# Patient Record
Sex: FEMALE | Race: WHITE | Employment: OTHER | URBAN - METROPOLITAN AREA
[De-identification: names, ages, dates, MRNs, and addresses within clinical notes are randomized per-mention and may not be internally consistent; named-entity substitution may affect disease eponyms.]

---

## 2017-01-11 ENCOUNTER — OFFICE VISIT (OUTPATIENT)
Dept: ORTHOPEDIC SURGERY | Age: 57
End: 2017-01-11

## 2017-01-11 VITALS
HEART RATE: 73 BPM | SYSTOLIC BLOOD PRESSURE: 137 MMHG | HEIGHT: 67 IN | WEIGHT: 139 LBS | BODY MASS INDEX: 21.82 KG/M2 | DIASTOLIC BLOOD PRESSURE: 62 MMHG

## 2017-01-11 DIAGNOSIS — M51.36 OTHER INTERVERTEBRAL DISC DEGENERATION, LUMBAR REGION: ICD-10-CM

## 2017-01-11 DIAGNOSIS — M54.17 RADICULOPATHY, LUMBOSACRAL REGION: ICD-10-CM

## 2017-01-11 DIAGNOSIS — M47.816 SPONDYLOSIS WITHOUT MYELOPATHY OR RADICULOPATHY, LUMBAR REGION: Primary | ICD-10-CM

## 2017-01-11 RX ORDER — PREGABALIN 300 MG/1
300 CAPSULE ORAL 2 TIMES DAILY
Qty: 60 CAP | Refills: 1 | Status: SHIPPED | OUTPATIENT
Start: 2017-01-11

## 2017-01-11 NOTE — MR AVS SNAPSHOT
Visit Information Date & Time Provider Department Dept. Phone Encounter #  
 1/11/2017  2:45 PM Peter Knight  [facility], [address] [facility] [place] [phone] Follow-up Instructions Return in about 4 weeks (around 2/8/2017). Upcoming Health Maintenance Date Due Hepatitis C Screening 1960 DTaP/Tdap/Td series (1 - Tdap) 5/4/1980 PAP AKA CERVICAL CYTOLOGY 5/4/1980 BREAST CANCER SCRN MAMMOGRAM 5/4/2009 FOBT Q 1 YEAR AGE 50-75 5/4/2009 INFLUENZA AGE 9 TO ADULT 8/1/2016 Allergies as of 1/11/2017  Review Complete On: 1/11/2017 By: Peter Knight MD  
  
 Severity Noted Reaction Type Reactions Codeine    Nausea and Vomiting Morphine    Nausea Only PO is ok just not IV Current Immunizations  Never Reviewed No immunizations on file. Not reviewed this visit You Were Diagnosed With   
  
 Codes Comments Spondylosis without myelopathy or radiculopathy, lumbar region    -  Primary ICD-10-CM: M47.816 ICD-9-CM: 721.3 Other intervertebral disc degeneration, lumbar region     ICD-10-CM: M51.36 
ICD-9-CM: 722.52 Radiculopathy, lumbosacral region     ICD-10-CM: M54.17 ICD-9-CM: 724.4 Vitals BP Pulse Height(growth percentile) Weight(growth percentile) BMI OB Status 137/62 73 5' 7\" (1.702 m) 139 lb (63 kg) 21.77 kg/m2 Hysterectomy Smoking Status Former Smoker Vitals History BMI and BSA Data Body Mass Index Body Surface Area 21.77 kg/m 2 1.73 m 2 Preferred Pharmacy Pharmacy Name Phone [id number] [address] [place] [address] Your Updated Medication List  
  
   
This list is accurate as of: 1/11/17  3:00 PM.  Always use your most recent med list.  
  
  
  
  
 ATIVAN 1 mg tablet Generic drug:  LORazepam  
Take 1 mg by mouth nightly as needed for Anxiety. atorvastatin 20 mg tablet Commonly known as:  LIPITOR Take 10 mg by mouth daily. diclofenac 1.3 % Pt12 Commonly known as:  FLECTOR  
1 Patch by TransDERmal route two (2) times a day for 30 days. FISH OIL PO Take  by mouth.  
  
 * HYDROcodone-acetaminophen 7.5-325 mg per tablet Commonly known as:  Lynnetta Gambino Take  by mouth.  
  
 * HYDROcodone-acetaminophen 7.5-325 mg per tablet Commonly known as:  Lynnetta Gambino Take 1 Tab by mouth four (4) times daily for 30 days. As needed for breakthrough pain; fill on/after 12/21/13  Indications: PAIN  
  
 * lidocaine 5 % Commonly known as:  Carrie Reece Apply patch to the affected area for 12 hours a day and remove for 12 hours a day. * lidocaine 5 % Commonly known as:  Carrie Reece Apply patch to the affected area for 12 hours a day and remove for 12 hours a day. loratadine 10 mg tablet Commonly known as:  CLARITIN  
TAKE 1 TAB BY MOUTH ONCE A DAY FOR 30 DAYS  
  
 LOTEMAX 0.5 % Drpg Generic drug:  loteprednol etabonate APPLY 1 DROP INTO LEFT EYE FOUR TIMES A DAY  
  
 montelukast 10 mg tablet Commonly known as:  SINGULAIR  
TAKE 1 TABLET ONCE A DAY * morphine CR 15 mg CR tablet Commonly known as:  MS CONTIN Take 1 Tab by mouth every six (6) hours for 30 days. For chronic pain; fill on/after 8/20/13   Indications: CHRONIC PAIN, NEUROPATHIC PAIN, SEVERE PAIN  
  
 * morphine CR 15 mg CR tablet Commonly known as:  MS CONTIN Take 1 Tab by mouth every six (6) hours for 30 days. For chronic pain; fill on/after 11/20/13  Indications: CHRONIC PAIN, NEUROPATHIC PAIN, SEVERE PAIN  
  
 * morphine CR 15 mg CR tablet Commonly known as:  MS CONTIN Take 1 Tab by mouth every six (6) hours for 30 days. For chronic pain; fill on/after 12/21/13  Indications: CHRONIC PAIN, NEUROPATHIC PAIN, SEVERE PAIN  
  
 omeprazole 40 mg capsule Commonly known as:  PRILOSEC Take 40 mg by mouth daily. * pregabalin 50 mg capsule [facility]  [facility] Cap by mouth two (2) times a day. Max Daily Amount: 100 mg.  
  
 * pregabalin 50 mg capsule Commonly known as:  Pema Campa Take 1 Cap by mouth two (2) times a day. Max Daily Amount: 100 mg.  
  
 * pregabalin 75 mg capsule Commonly known as:  Pema Campa Take 1 Cap by mouth two (2) times a day. Max Daily Amount: 150 mg.  
  
 * pregabalin 150 mg capsule Commonly known as:  Pema Campa Take 1 Cap by mouth two (2) times a day. Max Daily Amount: 300 mg.  
  
 * pregabalin 225 mg capsule Commonly known as:  Pema Campa Take 1 Cap by mouth two (2) times a day. Max Daily Amount: 450 mg.  
  
 * pregabalin 300 mg capsule Commonly known as:  Pema Campa Take 1 Cap by mouth two (2) times a day. Max Daily Amount: 600 mg.  
  
 raNITIdine 150 mg tablet Commonly known as:  ZANTAC TAKE 1 TAB BY MOUTH TWO TIMES A DAY FOR 30 DAYS  
  
 traMADol 50 mg tablet Commonly known as:  ULTRAM  
Take 1 Tab by mouth two (2) times a day. Max Daily Amount: 100 mg.  
  
 valACYclovir 500 mg tablet Commonly known as:  VALTREX  
500 mg.  
  
 venlafaxine 75 mg tablet Commonly known as:  [facility] Take 1 Tab by mouth three (3) times daily for 30 days. VITAMIN B-12 1,000 mcg sublingual tablet Generic drug:  cyanocobalamin Take 1,000 mcg by mouth daily. VITAMIN D2 50,000 unit capsule Generic drug:  ergocalciferol Take 50,000 Units by mouth every seven (7) days. WELLBUTRIN 100 mg tablet Generic drug:  buPROPion Take 100 mg by mouth daily. * Notice: This list has 13 medication(s) that are the same as other medications prescribed for you. Read the directions carefully, and ask your doctor or other care provider to review them with you. Prescriptions Printed Refills  
 pregabalin (LYRICA) 300 mg capsule 1 Sig: Take 1 Cap by mouth two (2) times a day. Max Daily Amount: 600 mg. Class: Print Route: Oral  
  
We Performed the Following REFERRAL TO ORTHOPEDICS [FHN284 Custom] Comments:  
 Bilateral hip pain Follow-up Instructions Return in about 4 weeks (around 2/8/2017). Referral Information Referral ID Referred By Referred To  
  
 [id number] DEE HOYOS III Not Available Visits Status Start Date End Date 1 New Request 1/11/17 1/11/18 If your referral has a status of pending review or denied, additional information will be sent to support the outcome of this decision. Introducing [facility]! Dear Farzaneh: Thank you for requesting a Rainbow account. Our records indicate that you already have an active Rainbow account. You can access your account anytime at [email or web address] Did you know that you can access your hospital and ER discharge instructions at any time in Rainbow? You can also review all of your test results from your hospital stay or ER visit. Additional Information If you have questions, please visit the Frequently Asked Questions section of the Rainbow website at [email or web address] Remember, Rainbow is NOT to be used for urgent needs. For medical emergencies, dial 911. Now available from your iPhone and Android! Please provide this summary of care documentation to your next provider. Your primary care clinician is listed as GURVINDER James. If you have any questions after today's visit, please call [phone].

## 2017-01-11 NOTE — PROGRESS NOTES
[facility]  [address]  [address], [address]  Phone: [phone]  Fax: [phone]        PROGRESS NOTE      HISTORY OF PRESENT ILLNESS:  The patient is a 61 y.o. female and was seen today for follow up of low back pain extending posterolaterally down BLE. She states BLE pain extends to knees and occasionally to the ankles. Any sustained movement exacerbates pain. She reports onset of back pain after a [place] in [id number]. More recently, she has complaints of upper back pain. Patient reports being discharged from pain management. Note from Porfirio Douglas NP dated 05/12/16 indicates that patient was seen by Dr. Marilu Packer in the ER who said he knew a doctor who could help her with her back pain. According to the notes, patient has been having issues with esophagitis for which she sees Dr. Nicol Wolff. He has taken her off of Motrin and been prescribed Prilosec and Zantac with benefit. Patient reports occasional use of Tramadol and Vicodin. She reports no relief with Lidoderm patches. Patient previously failed Michial Deandre, and TOPAMAX. She tolerates increased Lyrica 150 mg BID with initial benefit. Pt completes her HEP daily. She previously received left SI injection without relief. She attended to physical therapy and was switched to aquatic therapy with significant relief. Patient denies change in bowel or bladder habits. Pt denies fever, weight loss, or skin changes. Preliminary reading of lumbar plain films revealed disc space narrowing at L3-L4. Anterior osteophytes at L3-L4. No acute pathology identified. Lumbar spine MRI dated 11/30/16 reviewed. Per report, mild multilevel disc disease showing little or no significant change compared with the previous examination. No neural impingement. Extensive fatty infiltration of end plates of B5-1 indicative of chronic DDD, not appreciably changed.  Very mild discogenic endplate marrow edema on the left at L4-5 has decreased since the prior exam. At her last clinical appointment, I could not explain her symptoms based on diagnostic testing/spinal pathology. I increased her Lyrica to 225 mg BID. I encouraged patient to perform her HEP daily. The patient returns today with pain centralized to the low back pain extending posterolaterally into the BLE to the knees. Patient states pain no longer extends below the knees. She rates pain 5/10, an improvement since her last visit (8/10). Patient also has chronic bilateral hip pain (R>L). She tolerates increased Lyrica 225 mg BID with benefit.  reviewed. Past Medical History   Diagnosis Date    Carotid duplex 11/18/2013     No significant occlusive disease bilaterally.  Chronic pain     GERD (gastroesophageal reflux disease)     Hiatal hernia     History of myocardial perfusion scan 11/12/2013     No evidence of ongoing ischemia. Mild inferior defect likely artifact. No RWMA. EF 57%. Neg EKG on pharm stress test.    Hypercholesterolemia     Lower back pain     Sciatic nerve pain     Sciatica     Unstable gait         Social History     Social History    Marital status:      Spouse name: N/A    Number of children: N/A    Years of education: N/A     Occupational History    Not on file. Social History Main Topics    Smoking status: Former Smoker     Packs/day: 0.50     Years: 35.00     Types: Cigarettes    Smokeless tobacco: Never Used    Alcohol use Yes      Comment: rarely    Drug use: Yes     Special: Marijuana    Sexual activity: No     Other Topics Concern    Not on file     Social History Narrative    ** Merged History Encounter **            Current Outpatient Prescriptions   Medication Sig Dispense Refill    pregabalin (LYRICA) 300 mg capsule Take 1 Cap by mouth two (2) times a day.  Max Daily Amount: 600 mg. 60 Cap 1    montelukast (SINGULAIR) 10 mg tablet TAKE 1 TABLET ONCE A DAY  0    pregabalin (LYRICA) 225 mg capsule Take 1 Cap by mouth two (2) times a day. Max Daily Amount: 450 mg. 60 Cap 1    valACYclovir (VALTREX) 500 mg tablet 500 mg.  LOTEMAX 0.5 % drpg APPLY 1 DROP INTO LEFT EYE FOUR TIMES A DAY  3    loratadine (CLARITIN) 10 mg tablet TAKE 1 TAB BY MOUTH ONCE A DAY FOR 30 DAYS  6    ranitidine (ZANTAC) 150 mg tablet TAKE 1 TAB BY MOUTH TWO TIMES A DAY FOR 30 DAYS  3    lidocaine (LIDODERM) 5 % Apply patch to the affected area for 12 hours a day and remove for 12 hours a day. 30 Each 1    lidocaine (LIDODERM) 5 % Apply patch to the affected area for 12 hours a day and remove for 12 hours a day. 1 Each 0    traMADol (ULTRAM) 50 mg tablet Take 1 Tab by mouth two (2) times a day. Max Daily Amount: 100 mg. 50 Tab 0    omeprazole (PRILOSEC) 40 mg capsule Take 40 mg by mouth daily.  ergocalciferol (VITAMIN D2) 50,000 unit capsule Take 50,000 Units by mouth every seven (7) days.  atorvastatin (LIPITOR) 20 mg tablet Take 10 mg by mouth daily.  LORazepam (ATIVAN) 1 mg tablet Take 1 mg by mouth nightly as needed for Anxiety.  cyanocobalamin (VITAMIN B-12) 1,000 mcg sublingual tablet Take 1,000 mcg by mouth daily.  HYDROcodone-acetaminophen (NORCO) 7.5-325 mg per tablet Take  by mouth.  buPROPion (WELLBUTRIN) 100 mg tablet Take 100 mg by mouth daily.  pregabalin (LYRICA) 150 mg capsule Take 1 Cap by mouth two (2) times a day. Max Daily Amount: 300 mg. 60 Cap 1    pregabalin (LYRICA) 75 mg capsule Take 1 Cap by mouth two (2) times a day. Max Daily Amount: 150 mg. 60 Cap 1    pregabalin (LYRICA) 50 mg capsule Take 1 Cap by mouth two (2) times a day. Max Daily Amount: 100 mg. 60 Cap 1    pregabalin (LYRICA) 50 mg capsule Take 1 Cap by mouth two (2) times a day. Max Daily Amount: 100 mg. 60 Cap 1    DOCOSAHEXANOIC ACID/EPA (FISH OIL PO) Take  by mouth.  HYDROcodone-acetaminophen (NORCO) 7.5-325 mg per tablet Take 1 Tab by mouth four (4) times daily for 30 days.  As needed for breakthrough pain; fill on/after 12/21/13  Indications: PAIN 120 Tab 0    morphine CR (MS CONTIN) 15 mg CR tablet Take 1 Tab by mouth every six (6) hours for 30 days. For chronic pain; fill on/after 12/21/13  Indications: CHRONIC PAIN, NEUROPATHIC PAIN, SEVERE PAIN 120 Tab 0    morphine CR (MS CONTIN) 15 mg CR tablet Take 1 Tab by mouth every six (6) hours for 30 days. For chronic pain; fill on/after 11/20/13  Indications: CHRONIC PAIN, NEUROPATHIC PAIN, SEVERE PAIN 120 Tab 0    morphine CR (MS CONTIN) 15 mg CR tablet Take 1 Tab by mouth every six (6) hours for 30 days. For chronic pain; fill on/after 8/20/13    Indications: CHRONIC PAIN, NEUROPATHIC PAIN, SEVERE PAIN 120 Tab 0    venlafaxine (EFFEXOR) 75 mg tablet Take 1 Tab by mouth three (3) times daily for 30 days. 90 Tab 5    diclofenac epolamine (FLECTOR) 1.3 % PtMd transdermal patch 1 Patch by TransDERmal route two (2) times a day for 30 days. 60 Patch 5       Allergies   Allergen Reactions    Codeine Nausea and Vomiting    Morphine Nausea Only     PO is ok just not IV           PHYSICAL EXAMINATION    Visit Vitals    /62    Pulse 73    Ht 5' 7\" (1.702 m)    Wt 139 lb (63 kg)    BMI 21.77 kg/m2       CONSTITUTIONAL: NAD, A&O x 3  SENSATION: No increased groin pain with internal rotation of the bilateral hips. Intact to light touch throughout  RANGE OF MOTION: Good ROM, bilateral hips. The patient has full passive range of motion in all four extremities. MOTOR:  Straight Leg Raise: Negative, bilateral               Hip Flex Knee Ext Knee Flex Ankle DF GTE Ankle PF Tone   Right +4/5 +4/5 +4/5 +4/5 +4/5 +4/5 +4/5   Left +4/5 +4/5 +4/5 +4/5 +4/5 +4/5 +4/5       ASSESSMENT   Farzaneh was seen today for follow-up.     Diagnoses and all orders for this visit:    Spondylosis without myelopathy or radiculopathy, lumbar region  -     REFERRAL TO ORTHOPEDICS    Other intervertebral disc degeneration, lumbar region  -     REFERRAL TO ORTHOPEDICS    Radiculopathy, lumbosacral region  -     REFERRAL TO ORTHOPEDICS    Other orders  -     pregabalin (LYRICA) 300 mg capsule; Take 1 Cap by mouth two (2) times a day. Max Daily Amount: 600 mg. IMPRESSION AND PLAN:  Her pain appears to have centralized and pain no longer extends below the knees of the BLE. She also reports of chronic bilateral hip problems and pain. Her Examination was not consistent with hip necessarily. Her ROM of the bilateral hips was well-maintained. At her request, I will refer her to ortho for further evaluation. I will increase her Lyrica to 300 mg BID. Patient advised to call the office if intolerant to higher dose. I will see the patient back in 1 month's time or earlier if needed. Written by [place], as dictated by Pamela Bueno MD  I examined the patient, reviewed and agree with the note.

## 2017-02-01 ENCOUNTER — OFFICE VISIT (OUTPATIENT)
Dept: ORTHOPEDIC SURGERY | Age: 57
End: 2017-02-01

## 2017-02-01 VITALS
HEART RATE: 59 BPM | SYSTOLIC BLOOD PRESSURE: 112 MMHG | TEMPERATURE: 97.3 F | HEIGHT: 67 IN | BODY MASS INDEX: 21.82 KG/M2 | DIASTOLIC BLOOD PRESSURE: 60 MMHG | WEIGHT: 139 LBS

## 2017-02-01 DIAGNOSIS — M16.11 PRIMARY OSTEOARTHRITIS OF RIGHT HIP: ICD-10-CM

## 2017-02-01 DIAGNOSIS — M25.552 BILATERAL HIP PAIN: Primary | ICD-10-CM

## 2017-02-01 DIAGNOSIS — M54.17 LUMBOSACRAL NEURITIS: ICD-10-CM

## 2017-02-01 DIAGNOSIS — M54.50 LOW BACK PAIN, NON-SPECIFIC: ICD-10-CM

## 2017-02-01 DIAGNOSIS — M47.816 SPONDYLOSIS WITHOUT MYELOPATHY OR RADICULOPATHY, LUMBAR REGION: ICD-10-CM

## 2017-02-01 DIAGNOSIS — S92.414D CLOSED NONDISPLACED FRACTURE OF PROXIMAL PHALANX OF RIGHT GREAT TOE WITH ROUTINE HEALING, SUBSEQUENT ENCOUNTER: ICD-10-CM

## 2017-02-01 DIAGNOSIS — R22.42 FOOT MASS, LEFT: ICD-10-CM

## 2017-02-01 DIAGNOSIS — M25.551 BILATERAL HIP PAIN: Primary | ICD-10-CM

## 2017-02-01 DIAGNOSIS — M51.36 OTHER INTERVERTEBRAL DISC DEGENERATION, LUMBAR REGION: ICD-10-CM

## 2017-02-01 DIAGNOSIS — M16.12 PRIMARY OSTEOARTHRITIS OF LEFT HIP: ICD-10-CM

## 2017-02-01 DIAGNOSIS — M77.42 METATARSALGIA OF LEFT FOOT: ICD-10-CM

## 2017-02-01 DIAGNOSIS — M54.17 RADICULOPATHY, LUMBOSACRAL REGION: ICD-10-CM

## 2017-02-01 DIAGNOSIS — M79.672 FOOT PAIN, BILATERAL: ICD-10-CM

## 2017-02-01 DIAGNOSIS — M79.671 FOOT PAIN, BILATERAL: ICD-10-CM

## 2017-02-01 DIAGNOSIS — M51.36 DDD (DEGENERATIVE DISC DISEASE), LUMBAR: ICD-10-CM

## 2017-02-01 DIAGNOSIS — M77.41 METATARSALGIA, RIGHT FOOT: ICD-10-CM

## 2017-02-01 RX ORDER — HYDROCODONE BITARTRATE AND ACETAMINOPHEN 7.5; 325 MG/1; MG/1
1-2 TABLET ORAL
Qty: 60 TAB | Refills: 0 | Status: SHIPPED | OUTPATIENT
Start: 2017-02-01

## 2017-02-01 RX ORDER — BETAMETHASONE SODIUM PHOSPHATE AND BETAMETHASONE ACETATE 3; 3 MG/ML; MG/ML
3 INJECTION, SUSPENSION INTRA-ARTICULAR; INTRALESIONAL; INTRAMUSCULAR; SOFT TISSUE ONCE
Qty: 0.5 ML | Refills: 0
Start: 2017-02-01 | End: 2017-02-01

## 2017-02-01 RX ORDER — BUPIVACAINE HYDROCHLORIDE 2.5 MG/ML
4 INJECTION, SOLUTION EPIDURAL; INFILTRATION; INTRACAUDAL ONCE
Qty: 4 ML | Refills: 0
Start: 2017-02-01 | End: 2017-02-01

## 2017-02-01 NOTE — PROGRESS NOTES
Patient: Leo Santillan                MRN: [id number]       SSN: [id number]  YOB: 1960        AGE: 61 y.o. SEX: female    PCP: Nomi Tenorio NP  02/01/17    Chief Complaint   Patient presents with    Hip Pain     Mars     HISTORY:  June Jovana Lu Mantilla is a 61 y.o. female who is seen for bilateral hip and low back pain. She reports increased hip and low back pain since 2012. She states that her  noted that she has walked funny for years. She says that she smashed her right fourth toe last night and reports a h/o two fractures in her right great toe. She has undergone bilateral foot operations by Dr. Tyshawn Villafana. She is currently being seen by Dr. Alo Newman for low back pain who referred her for hip pain. She takes Lyrica for back pain daily. She reports that she was discharged from Pain Management because she was self-medicating with marijuana. She says that she has been black-balled and felt like she was being over-medicated in pain management. She was previously in aquatic therapy at the [facility] in [place]. Occupation, etc:  Ms. Aliyah Mantilla is not diabetic or hypertensive. Current weight is 139 pounds. She goes by either \"Mrs. Smith\" or just \"June. \"  She has a pitbull. She states that she is taking a mission trip with [place] in June to [facility] in [place] to supply a village. She lives with her , dog, three cats, several chickens, and pig that recently had babies in [place].       Weight Metrics 2/1/2017 1/11/2017 12/7/2016 11/2/2016 10/5/2016 9/7/2016 8/8/2016   Weight 139 lb 139 lb 135 lb 6.4 oz 130 lb 3.2 oz 128 lb 130 lb 126 lb   BMI 21.77 kg/m2 21.77 kg/m2 21.21 kg/m2 20.39 kg/m2 20.05 kg/m2 20.36 kg/m2 19.73 kg/m2     Patient Active Problem List   Diagnosis Code    Lower back pain M54.5    Unstable gait R26.81    Lumbago M54.5    Sacroiliitis, not elsewhere classified ([facility] [address] M46.1    Degeneration of lumbar or lumbosacral intervertebral disc M51.37    Enthesopathy of hip region M76.899    Encounter for long-term (current) use of other medications Z79.899    Weight loss, unintentional R63.4    Low back pain without sciatica M54.5    Spondylosis without myelopathy or radiculopathy, lumbar region M47.816    Lumbosacral neuritis M54.17    DDD (degenerative disc disease), lumbar M51.36    Other intervertebral disc degeneration, lumbar region M51.36    Radiculopathy, lumbosacral region M54.17    Low back pain M54.5    Low back pain, non-specific M54.5     REVIEW OF SYSTEMS: All Below are Negative except: See HPI   Constitutional: negative for fever, chills, and weight loss. Cardiovascular: negative for chest pain, claudication, leg swelling, SOB, HAYES   Gastrointestinal: Negative for pain, N/V/C/D, Blood in stool or urine, dysuria,  hematuria, incontinence, pelvic pain. Musculoskeletal: See HPI   Neurological: Negative for dizziness and weakness. Negative for headaches, Visual changes, confusion, seizures   Phychiatric/Behavioral: Negative for depression, memory loss, substance  abuse. Extremities: Negative for hair changes, rash, or skin lesion changes. Hematologic: Negative for bleeding problems, bruising, pallor or swollen lymph  nodes   Peripheral Vascular: No calf pain, no circulation deficits. Social History     Social History    Marital status:      Spouse name: N/A    Number of children: N/A    Years of education: N/A     Occupational History    Not on file.      Social History Main Topics    Smoking status: Former Smoker     Packs/day: 0.50     Years: 35.00     Types: Cigarettes    Smokeless tobacco: Never Used    Alcohol use Yes      Comment: rarely    Drug use: Yes     Special: Marijuana    Sexual activity: No     Other Topics Concern    Not on file     Social History Narrative    ** Merged History Encounter **           Allergies   Allergen Reactions    Codeine Nausea and Vomiting    Morphine Nausea Only     PO is ok just not IV       Current Outpatient Prescriptions   Medication Sig    pregabalin (LYRICA) 300 mg capsule Take 1 Cap by mouth two (2) times a day. Max Daily Amount: 600 mg.    montelukast (SINGULAIR) 10 mg tablet TAKE 1 TABLET ONCE A DAY    valACYclovir (VALTREX) 500 mg tablet 500 mg.  LOTEMAX 0.5 % drpg APPLY 1 DROP INTO LEFT EYE FOUR TIMES A DAY    loratadine (CLARITIN) 10 mg tablet TAKE 1 TAB BY MOUTH ONCE A DAY FOR 30 DAYS    ranitidine (ZANTAC) 150 mg tablet TAKE 1 TAB BY MOUTH TWO TIMES A DAY FOR 30 DAYS    lidocaine (LIDODERM) 5 % Apply patch to the affected area for 12 hours a day and remove for 12 hours a day.  traMADol (ULTRAM) 50 mg tablet Take 1 Tab by mouth two (2) times a day. Max Daily Amount: 100 mg.    omeprazole (PRILOSEC) 40 mg capsule Take 40 mg by mouth daily.  ergocalciferol (VITAMIN D2) 50,000 unit capsule Take 50,000 Units by mouth every seven (7) days.  atorvastatin (LIPITOR) 20 mg tablet Take 10 mg by mouth daily.  LORazepam (ATIVAN) 1 mg tablet Take 1 mg by mouth nightly as needed for Anxiety.  cyanocobalamin (VITAMIN B-12) 1,000 mcg sublingual tablet Take 1,000 mcg by mouth daily.  buPROPion (WELLBUTRIN) 100 mg tablet Take 100 mg by mouth daily.  pregabalin (LYRICA) 225 mg capsule Take 1 Cap by mouth two (2) times a day. Max Daily Amount: 450 mg.  pregabalin (LYRICA) 150 mg capsule Take 1 Cap by mouth two (2) times a day. Max Daily Amount: 300 mg.  pregabalin (LYRICA) 75 mg capsule Take 1 Cap by mouth two (2) times a day. Max Daily Amount: 150 mg.    lidocaine (LIDODERM) 5 % Apply patch to the affected area for 12 hours a day and remove for 12 hours a day.  pregabalin (LYRICA) 50 mg capsule Take 1 Cap by mouth two (2) times a day. Max Daily Amount: 100 mg.  pregabalin (LYRICA) 50 mg capsule Take 1 Cap by mouth two (2) times a day.  Max Daily Amount: 100 mg.    HYDROcodone-acetaminophen (NORCO) 7.5-325 mg per tablet Take  by mouth.  DOCOSAHEXANOIC ACID/EPA (FISH OIL PO) Take  by mouth.  HYDROcodone-acetaminophen (NORCO) 7.5-325 mg per tablet Take 1 Tab by mouth four (4) times daily for 30 days. As needed for breakthrough pain; fill on/after 12/21/13  Indications: PAIN    morphine CR (MS CONTIN) 15 mg CR tablet Take 1 Tab by mouth every six (6) hours for 30 days. For chronic pain; fill on/after 12/21/13  Indications: CHRONIC PAIN, NEUROPATHIC PAIN, SEVERE PAIN    morphine CR (MS CONTIN) 15 mg CR tablet Take 1 Tab by mouth every six (6) hours for 30 days. For chronic pain; fill on/after 11/20/13  Indications: CHRONIC PAIN, NEUROPATHIC PAIN, SEVERE PAIN    morphine CR (MS CONTIN) 15 mg CR tablet Take 1 Tab by mouth every six (6) hours for 30 days. For chronic pain; fill on/after 8/20/13    Indications: CHRONIC PAIN, NEUROPATHIC PAIN, SEVERE PAIN    venlafaxine (EFFEXOR) 75 mg tablet Take 1 Tab by mouth three (3) times daily for 30 days.  diclofenac epolamine (FLECTOR) 1.3 % PtMd transdermal patch 1 Patch by TransDERmal route two (2) times a day for 30 days. No current facility-administered medications for this visit.        PHYSICAL EXAMINATION:  Visit Vitals    /60    Pulse (!) 59    Temp 97.3 °F (36.3 °C) (Oral)    Ht 5' 7\" (1.702 m)    Wt 139 lb (63 kg)    BMI 21.77 kg/m2      ORTHO EXAMINATION:  Examination Lumbar   Skin Intact   Tenderness +, right iliolumbar   Tightness +, right iliolumbar   Lordosis Normal   Kyphosis N/A   Scoliosis -   Flexion Fingertips to mid shin   Extension Minimal   Knee reflexes 2+   Ankle reflexes 1+   Straight leg raise -   Calf tenderness -     Examination Right hip Left hip   Skin Intact Intact   External Rotation ROM 20 20   Internal Rotation ROM 30 30   Trochanteric tenderness - -   Hip flexion contracture - -   Antalgic gait - -   Trendelenberg sign - -   Lumbar tenderness - -   Straight leg raise - -   Calf tenderness - -   Neurovascular Intact Intact PROCEDURE:  After discussing treatment options, patient's right iliolumbar region was injected with 4 cc Marcaine and 1/2 cc Celestone. Chart reviewed for the following:   Mireya Chong MD, have reviewed the History, Physical and updated the Allergic reactions for [facility]     TIME OUT performed immediately prior to start of procedure:  Mireya Chong MD, have performed the following reviews on Farzaneh Kelly Paz prior to the start of the procedure:            * Patient was identified by name and date of birth   * Agreement on procedure being performed was verified  * Risks and Benefits explained to the patient  * Procedure site verified and marked as necessary  * Patient was positioned for comfort  * Consent was obtained     Time: 9:37 AM     Date of procedure: 2/1/2017    Procedure performed by:  Marian Santos MD    Ms. [facility] tolerated the procedure well with no complications. MRI LUMBAR SPINE WO CONT 11/29/16  IMPRESSION:  1. Mild multilevel disc disease showing little or no significant change compared with the previous examination. No neural impingement. 2. Extensive fatty infiltration of end plates of N2-1 indicative of chronic DDD, not appreciably changed. 3. Very mild discogenic endplate marrow edema on the left at L4-5 has decreased since the prior exam.    RADIOGRAPHS:  XR JOHN HIPS 2/1/17  IMPRESSION:  No fractures, mild joint space narrowing on left, minimal joint space narrowing on right, small lateral osteophytes present. IMPRESSION:      ICD-10-CM ICD-9-CM    1. Bilateral hip pain M25.551 719.45 AMB POC X-RAY RADEX HIP UNI WITH PELVIS 2-3 VIEWS    M25.552  HYDROcodone-acetaminophen (NORCO) 7.5-325 mg per tablet      REFERRAL TO PAIN MANAGEMENT   2.  Spondylosis without myelopathy or radiculopathy, lumbar region M47.816 721.3 betamethasone (CELESTONE SOLUSPAN) 6 mg/mL injection      BETAMETHASONE ACETATE & SODIUM PHOSPHATE INJECTION 3 MG EA.      THER/PROPH/DIAG INJECTION, SUBCUT/IM      bupivacaine, PF, (MARCAINE, PF,) 0.25 % (2.5 mg/mL) injection      HYDROcodone-acetaminophen (NORCO) 7.5-325 mg per tablet      REFERRAL TO PAIN MANAGEMENT   3. Other intervertebral disc degeneration, lumbar region M51.36 722.52 betamethasone (CELESTONE SOLUSPAN) 6 mg/mL injection      BETAMETHASONE ACETATE & SODIUM PHOSPHATE INJECTION 3 MG EA. THER/PROPH/DIAG INJECTION, SUBCUT/IM      bupivacaine, PF, (MARCAINE, PF,) 0.25 % (2.5 mg/mL) injection      HYDROcodone-acetaminophen (NORCO) 7.5-325 mg per tablet      REFERRAL TO PAIN MANAGEMENT   4. Radiculopathy, lumbosacral region M54.17 724.4 betamethasone (CELESTONE SOLUSPAN) 6 mg/mL injection      BETAMETHASONE ACETATE & SODIUM PHOSPHATE INJECTION 3 MG EA. THER/PROPH/DIAG INJECTION, SUBCUT/IM      bupivacaine, PF, (MARCAINE, PF,) 0.25 % (2.5 mg/mL) injection      HYDROcodone-acetaminophen (NORCO) 7.5-325 mg per tablet      REFERRAL TO PAIN MANAGEMENT   5. Low back pain, non-specific M54.5 724.2 betamethasone (CELESTONE SOLUSPAN) 6 mg/mL injection      BETAMETHASONE ACETATE & SODIUM PHOSPHATE INJECTION 3 MG EA. THER/PROPH/DIAG INJECTION, SUBCUT/IM      bupivacaine, PF, (MARCAINE, PF,) 0.25 % (2.5 mg/mL) injection      HYDROcodone-acetaminophen (NORCO) 7.5-325 mg per tablet      REFERRAL TO PAIN MANAGEMENT   6. Lumbosacral neuritis M54.17 724.4 betamethasone (CELESTONE SOLUSPAN) 6 mg/mL injection      BETAMETHASONE ACETATE & SODIUM PHOSPHATE INJECTION 3 MG EA. THER/PROPH/DIAG INJECTION, SUBCUT/IM      bupivacaine, PF, (MARCAINE, PF,) 0.25 % (2.5 mg/mL) injection      HYDROcodone-acetaminophen (NORCO) 7.5-325 mg per tablet      REFERRAL TO PAIN MANAGEMENT   7. DDD (degenerative disc disease), lumbar M51.36 722.52 betamethasone (CELESTONE SOLUSPAN) 6 mg/mL injection      BETAMETHASONE ACETATE & SODIUM PHOSPHATE INJECTION 3 MG EA.       THER/PROPH/DIAG INJECTION, SUBCUT/IM      bupivacaine, PF, (MARCAINE, PF,) 0.25 % (2.5 mg/mL) injection      HYDROcodone-acetaminophen (NORCO) 7.5-325 mg per tablet      REFERRAL TO PAIN MANAGEMENT   8. Metatarsalgia, right foot M77.41 726.70 HYDROcodone-acetaminophen (NORCO) 7.5-325 mg per tablet      REFERRAL TO PAIN MANAGEMENT   9. Metatarsalgia of left foot M77.42 726.70 HYDROcodone-acetaminophen (NORCO) 7.5-325 mg per tablet      REFERRAL TO PAIN MANAGEMENT   10. Foot mass, left R22.42 782.2 HYDROcodone-acetaminophen (NORCO) 7.5-325 mg per tablet      REFERRAL TO PAIN MANAGEMENT   11. Foot pain, bilateral M79.671 729.5 HYDROcodone-acetaminophen (NORCO) 7.5-325 mg per tablet    M79.672  REFERRAL TO PAIN MANAGEMENT   12. Closed nondisplaced fracture of proximal phalanx of right great toe with routine healing, subsequent encounter S92.414D V54.19 HYDROcodone-acetaminophen (NORCO) 7.5-325 mg per tablet      REFERRAL TO PAIN MANAGEMENT   13. Primary osteoarthritis of left hip M16.12 715.15 HYDROcodone-acetaminophen (NORCO) 7.5-325 mg per tablet      REFERRAL TO PAIN MANAGEMENT    Mild   14. Primary osteoarthritis of right hip M16.11 715.15 HYDROcodone-acetaminophen (NORCO) 7.5-325 mg per tablet      REFERRAL TO PAIN MANAGEMENT    Minimal     PLAN:  After discussing treatment options, patient's right iliolumbar region was injected with 4 cc Marcaine and 1/2 cc Celestone. She will follow up as needed. There is no need for surgery at this time. She will continue to follow up with Dr. Almyra Castleman at the spine center for low back pain. She will start pain management. She will take Norco for the pain as needed at night.       Scribed by [employer] ([address]) as dictated by Chase Lou MD

## 2017-02-01 NOTE — PATIENT INSTRUCTIONS
Learning About How to Have a Healthy Back  What causes back pain? Back pain is often caused by overuse, strain, or injury. For example, people often hurt their backs playing sports or working in the yard, being jolted in a car accident, or lifting something too heavy. Aging plays a part too. Your bones and muscles tend to lose strength as you age, which makes injury more likely. The spongy discs between the bones of the spine (vertebrae) may suffer from wear and tear and no longer provide enough cushion between the bones. A disc that bulges or breaks open (herniated disc) can press on nerves, causing back pain. In some people, back pain is the result of arthritis, broken vertebrae caused by bone loss (osteoporosis), illness, or a spine problem. Although most people have back pain at one time or another, there are steps you can take to make it less likely. How can you have a healthy back? Reduce stress on your back through good posture  Slumping or slouching alone may not cause low back pain. But after the back has been strained or injured, bad posture can make pain worse. · Sleep in a position that maintains your back's normal curves and on a mattress that feels comfortable. Sleep on your side with a pillow between your knees, or sleep on your back with a pillow under your knees. These positions can reduce strain on your back. · Stand and sit up straight. \"Good posture\" generally means your ears, shoulders, and hips are in a straight line. · If you must stand for a long time, put one foot on a stool, ledge, or box. Switch feet every now and then. · Sit in a chair that is low enough to let you place both feet flat on the floor with both knees nearly level with your hips. If your chair or desk is too high, use a footrest to raise your knees. Place a small pillow, a rolled-up towel, or a lumbar roll in the curve of your back if you need extra support.   · Try a kneeling chair, which helps tilt your hips forward. This takes pressure off your lower back. · Try sitting on an exercise ball. It can rock from side to side, which helps keep your back loose. · When driving, keep your knees nearly level with your hips. Sit straight, and drive with both hands on the steering wheel. Your arms should be in a slightly bent position. Reduce stress on your back through careful lifting  · Squat down, bending at the hips and knees only. If you need to, put one knee to the floor and extend your other knee in front of you, bent at a right angle (half kneeling). · Press your chest straight forward. This helps keep your upper back straight while keeping a slight arch in your low back. · Hold the load as close to your body as possible, at the level of your belly button (navel). · Use your feet to change direction, taking small steps. · Lead with your hips as you change direction. Keep your shoulders in line with your hips as you move. · Set down your load carefully, squatting with your knees and hips only. Exercise and stretch your back  · Do some exercise on most days of the week, if your doctor says it is okay. You can walk, run, swim, or cycle. · Stretch your back muscles. Here are a few exercises to try:  [place] on your back, and gently pull one bent knee to your chest. Put that foot back on the floor, and then pull the other knee to your chest.  ¨ Do pelvic tilts. Lie on your back with your knees bent. Tighten your stomach muscles. Pull your belly button (navel) in and up toward your ribs. You should feel like your back is pressing to the floor and your hips and pelvis are slightly lifting off the floor. Hold for 6 seconds while breathing smoothly. ¨ Sit with your back flat against a wall. · Keep your core muscles strong. The muscles of your back, belly (abdomen), and buttocks support your spine. ¨ Pull in your belly and imagine pulling your navel toward your spine. Hold this for 6 seconds, then relax.  Remember to keep breathing normally as you tense your muscles. ¨ Do curl-ups. Always do them with your knees bent. Keep your low back on the floor, and curl your shoulders toward your knees using a smooth, slow motion. Keep your arms folded across your chest. If this bothers your neck, try putting your hands behind your neck (not your head), with your elbows spread apart. ¨ Lie on your back with your knees bent and your feet flat on the floor. Tighten your belly muscles, and then push with your feet and raise your buttocks up a few inches. Hold this position 6 seconds as you continue to breathe normally, then lower yourself slowly to the floor. Repeat 8 to 12 times. ¨ If you like group exercise, try Pilates or yoga. These classes have poses that strengthen the core muscles. Lead a healthy lifestyle  · Stay at a healthy weight to avoid strain on your back. · Do not smoke. Smoking increases the risk of osteoporosis, which weakens the spine. If you need help quitting, talk to your doctor about stop-smoking programs and medicines. These can increase your chances of quitting for good. Where can you learn more? Go to http://ariel-lucía.info/. Enter L315 in the search box to learn more about \"Learning About How to Have a Healthy Back. \"  Current as of: May 23, 2016  Content Version: 11.1  © 1170-4222 [employer]. Care instructions adapted under license by [employer] (which disclaims liability or warranty for this information). If you have questions about a medical condition or this instruction, always ask your healthcare professional. [place] [address] any warranty or liability for your use of this information.

## 2017-02-08 ENCOUNTER — OFFICE VISIT (OUTPATIENT)
Dept: ORTHOPEDIC SURGERY | Age: 57
End: 2017-02-08

## 2017-02-08 VITALS
DIASTOLIC BLOOD PRESSURE: 72 MMHG | WEIGHT: 139.2 LBS | HEIGHT: 67 IN | BODY MASS INDEX: 21.85 KG/M2 | SYSTOLIC BLOOD PRESSURE: 128 MMHG | RESPIRATION RATE: 16 BRPM | HEART RATE: 75 BPM

## 2017-02-08 DIAGNOSIS — M54.17 RADICULOPATHY, LUMBOSACRAL REGION: ICD-10-CM

## 2017-02-08 DIAGNOSIS — M51.36 OTHER INTERVERTEBRAL DISC DEGENERATION, LUMBAR REGION: ICD-10-CM

## 2017-02-08 DIAGNOSIS — M47.816 SPONDYLOSIS WITHOUT MYELOPATHY OR RADICULOPATHY, LUMBAR REGION: Primary | ICD-10-CM

## 2017-02-08 RX ORDER — PREGABALIN 225 MG/1
225 CAPSULE ORAL 2 TIMES DAILY
Qty: 60 CAP | Refills: 1 | Status: SHIPPED | OUTPATIENT
Start: 2017-02-08 | End: 2017-05-02 | Stop reason: SDUPTHER

## 2017-02-08 NOTE — PROGRESS NOTES
[facility]  [address], [place]   Phone: [phone]  Fax: [phone]        PROGRESS NOTE      HISTORY OF PRESENT ILLNESS:  The patient is a 61 y.o. female and was seen today for follow up of pain centralized to the low back pain extending posterolaterally into the BLE to the knees. Patient states pain no longer extends below the knees. Any sustained movement exacerbates pain. She reports onset of back pain after a [place] in [id number]. Patient also has chronic bilateral hip pain (R>L). Patient reports being discharged from pain management. Note from Jenny Maldonado NP dated 05/12/16 indicates that patient was seen by Dr. Malathi Gomes in the ER who said he knew a doctor who could help her with her back pain. According to the notes, patient has been having issues with esophagitis for which she sees Dr. Vicki Quiles. He has taken her off of Motrin and been prescribed Prilosec and Zantac with benefit. Patient reports occasional use of Tramadol and Vicodin. She reports no relief with Lidoderm patches. Patient previously failed Gerri Stephan, and TOPAMAX. She tolerates increased Lyrica 150 mg BID with initial benefit. Pt completes her HEP daily. She previously received left SI injection without relief. She attended to physical therapy and was switched to aquatic therapy with significant relief. Patient denies change in bowel or bladder habits. Pt denies fever, weight loss, or skin changes. Preliminary reading of lumbar plain films revealed disc space narrowing at L3-L4. Anterior osteophytes at L3-L4. No acute pathology identified. Lumbar spine MRI dated 11/30/16 reviewed. Per report, mild multilevel disc disease showing little or no significant change compared with the previous examination. No neural impingement. Extensive fatty infiltration of end plates of C7-1 indicative of chronic DDD, not appreciably changed.  Very mild discogenic endplate marrow edema on the left at L4-5 has decreased since the prior exam. At her last clinical appointment, her pain appeared to have centralized and pain no longer extended below the knees of the BLE. She also reported of chronic bilateral hip problems and pain. ROM of the bilateral hips was well-maintained. At her request, I referred her to ortho for further evaluation. I increased her Lyrica to 300 mg BID. The patient returns today with pain location and distribution remain unchanged. She rates pain 8/10, elevated since her last visit (5/10). She tolerates increased Lyrica 300 mg BID without additional relief and causes drowsiness. Note from Dr. Marlyn Pan dated 2/1/17 indicating patient was seen for bilateral hip and low back pain. Of note, she suffered an injury since I last saw patient with two fractures of the right great toe. At that time, he performed a trigger point injection to her right lower back and indicated she had minimal joint space narrowing at the right hip. Per patient, Dr. Marlyn Pan referred patient to pain management as she declined surgery. She was prescribed Vicodin by Dr. Marlyn Pan which she previously was receiving through her PCP.  reviewed. Past Medical History   Diagnosis Date    Carotid duplex 11/18/2013     No significant occlusive disease bilaterally.  Chronic pain     GERD (gastroesophageal reflux disease)     Hiatal hernia     History of myocardial perfusion scan 11/12/2013     No evidence of ongoing ischemia. Mild inferior defect likely artifact. No RWMA. EF 57%. Neg EKG on pharm stress test.    Hypercholesterolemia     Lower back pain     Sciatic nerve pain     Sciatica     Unstable gait         Social History     Social History    Marital status:      Spouse name: N/A    Number of children: N/A    Years of education: N/A     Occupational History    Not on file.      Social History Main Topics    Smoking status: Former Smoker     Packs/day: 0.50     Years: 35.00 Types: Cigarettes    Smokeless tobacco: Never Used    Alcohol use Yes      Comment: rarely    Drug use: Yes     Special: Marijuana    Sexual activity: No     Other Topics Concern    Not on file     Social History Narrative    ** Merged History Encounter **            Current Outpatient Prescriptions   Medication Sig Dispense Refill    pregabalin (LYRICA) 225 mg capsule Take 1 Cap by mouth two (2) times a day. Max Daily Amount: 450 mg. 60 Cap 1    HYDROcodone-acetaminophen (NORCO) 7.5-325 mg per tablet Take 1-2 Tabs by mouth nightly as needed for Pain. Max Daily Amount: 2 Tabs. 60 Tab 0    pregabalin (LYRICA) 300 mg capsule Take 1 Cap by mouth two (2) times a day. Max Daily Amount: 600 mg. 60 Cap 1    montelukast (SINGULAIR) 10 mg tablet TAKE 1 TABLET ONCE A DAY  0    LOTEMAX 0.5 % drpg APPLY 1 DROP INTO LEFT EYE FOUR TIMES A DAY  3    loratadine (CLARITIN) 10 mg tablet TAKE 1 TAB BY MOUTH ONCE A DAY FOR 30 DAYS  6    ranitidine (ZANTAC) 150 mg tablet TAKE 1 TAB BY MOUTH TWO TIMES A DAY FOR 30 DAYS  3    traMADol (ULTRAM) 50 mg tablet Take 1 Tab by mouth two (2) times a day. Max Daily Amount: 100 mg. 50 Tab 0    omeprazole (PRILOSEC) 40 mg capsule Take 40 mg by mouth daily.  ergocalciferol (VITAMIN D2) 50,000 unit capsule Take 50,000 Units by mouth every seven (7) days.  atorvastatin (LIPITOR) 20 mg tablet Take 10 mg by mouth daily.  LORazepam (ATIVAN) 1 mg tablet Take 1 mg by mouth nightly as needed for Anxiety.  cyanocobalamin (VITAMIN B-12) 1,000 mcg sublingual tablet Take 1,000 mcg by mouth daily.  buPROPion (WELLBUTRIN) 100 mg tablet Take 100 mg by mouth daily.  pregabalin (LYRICA) 225 mg capsule Take 1 Cap by mouth two (2) times a day. Max Daily Amount: 450 mg. 60 Cap 1    valACYclovir (VALTREX) 500 mg tablet 500 mg.      lidocaine (LIDODERM) 5 % Apply patch to the affected area for 12 hours a day and remove for 12 hours a day.  1 Each 0    DOCOSAHEXANOIC ACID/EPA (FISH OIL PO) Take  by mouth.  HYDROcodone-acetaminophen (NORCO) 7.5-325 mg per tablet Take 1 Tab by mouth four (4) times daily for 30 days. As needed for breakthrough pain; fill on/after 12/21/13  Indications: PAIN 120 Tab 0    morphine CR (MS CONTIN) 15 mg CR tablet Take 1 Tab by mouth every six (6) hours for 30 days. For chronic pain; fill on/after 12/21/13  Indications: CHRONIC PAIN, NEUROPATHIC PAIN, SEVERE PAIN 120 Tab 0    morphine CR (MS CONTIN) 15 mg CR tablet Take 1 Tab by mouth every six (6) hours for 30 days. For chronic pain; fill on/after 11/20/13  Indications: CHRONIC PAIN, NEUROPATHIC PAIN, SEVERE PAIN 120 Tab 0    morphine CR (MS CONTIN) 15 mg CR tablet Take 1 Tab by mouth every six (6) hours for 30 days. For chronic pain; fill on/after 8/20/13    Indications: CHRONIC PAIN, NEUROPATHIC PAIN, SEVERE PAIN 120 Tab 0    venlafaxine (EFFEXOR) 75 mg tablet Take 1 Tab by mouth three (3) times daily for 30 days. 90 Tab 5    diclofenac epolamine (FLECTOR) 1.3 % PtMd transdermal patch 1 Patch by TransDERmal route two (2) times a day for 30 days. 60 Patch 5       Allergies   Allergen Reactions    Codeine Nausea and Vomiting    Morphine Nausea Only     PO is ok just not IV           PHYSICAL EXAMINATION    Visit Vitals    /72 (BP 1 Location: Right arm, BP Patient Position: Sitting)    Pulse 75    Resp 16    Ht 5' 7\" (1.702 m)    Wt 139 lb 3.2 oz (63.1 kg)    BMI 21.8 kg/m2       CONSTITUTIONAL: NAD, A&O x 3  SENSATION: Intact to light touch throughout  RANGE OF MOTION: The patient has full passive range of motion in all four extremities. MOTOR:  Straight Leg Raise: Negative, bilateral               Hip Flex Knee Ext Knee Flex Ankle DF GTE Ankle PF Tone   Right +4/5 +4/5 +4/5 +4/5 +4/5 +4/5 +4/5   Left +4/5 +4/5 +4/5 +4/5 +4/5 +4/5 +4/5       ASSESSMENT   Farzaneh was seen today for back pain and leg pain.     Diagnoses and all orders for this visit:    Spondylosis without myelopathy or radiculopathy, lumbar region    Radiculopathy, lumbosacral region    Other intervertebral disc degeneration, lumbar region    Other orders  -     pregabalin (LYRICA) 225 mg capsule; Take 1 Cap by mouth two (2) times a day. Max Daily Amount: 450 mg.          IMPRESSION AND PLAN:  She will reduce her Lyrica back to 225 mg BID as she notes no improvement with 300 mg BI and was given refills. Patient advised to call the office if pain increases with the reduction in Lyrica. I will see the patient back in 3 month's time or earlier if needed. Written by Mireya Manuel, as dictated by Cassell Moritz, MD  I examined the patient, reviewed and agree with the note.

## 2017-02-08 NOTE — MR AVS SNAPSHOT
Visit Information Date & Time Provider Department Dept. Phone Encounter #  
 2/8/2017  2:45 PM Ashley Jewell MD [facility], [address] [facility] [place] [phone] [id number] Follow-up Instructions Return in about 3 months (around 5/8/2017). Upcoming Health Maintenance Date Due Hepatitis C Screening 1960 DTaP/Tdap/Td series (1 - Tdap) 5/4/1980 PAP AKA CERVICAL CYTOLOGY 5/4/1980 BREAST CANCER SCRN MAMMOGRAM 5/4/2009 FOBT Q 1 YEAR AGE 50-75 5/4/2009 INFLUENZA AGE 9 TO ADULT 8/1/2016 Allergies as of 2/8/2017  Review Complete On: 2/8/2017 By: Ashley Jewell MD  
  
 Severity Noted Reaction Type Reactions Codeine    Nausea and Vomiting Morphine    Nausea Only PO is ok just not IV Current Immunizations  Never Reviewed No immunizations on file. Not reviewed this visit You Were Diagnosed With   
  
 Codes Comments Spondylosis without myelopathy or radiculopathy, lumbar region    -  Primary ICD-10-CM: M47.816 ICD-9-CM: 721.3 Radiculopathy, lumbosacral region     ICD-10-CM: M54.17 ICD-9-CM: 724.4 Other intervertebral disc degeneration, lumbar region     ICD-10-CM: M51.36 
ICD-9-CM: 722.52 Vitals BP Pulse Resp Height(growth percentile) Weight(growth percentile) BMI  
 128/72 (BP 1 Location: Right arm, BP Patient Position: Sitting) 75 16 5' 7\" (1.702 m) 139 lb 3.2 oz (63.1 kg) 21.8 kg/m2 OB Status Smoking Status Hysterectomy Former Smoker Vitals History BMI and BSA Data Body Mass Index Body Surface Area  
 21.8 kg/m 2 1.73 m 2 Preferred Pharmacy Pharmacy Name Phone [id number] [address] [place] [address] Your Updated Medication List  
  
   
This list is accurate as of: 2/8/17  4:00 PM.  Always use your most recent med list.  
  
  
  
  
 ATIVAN 1 mg tablet Generic drug:  LORazepam  
 Take 1 mg by mouth nightly as needed for Anxiety. atorvastatin 20 mg tablet Commonly known as:  LIPITOR Take 10 mg by mouth daily. diclofenac 1.3 % Pt12 Commonly known as:  FLECTOR  
1 Patch by TransDERmal route two (2) times a day for 30 days. FISH OIL PO Take  by mouth.  
  
 * HYDROcodone-acetaminophen 7.5-325 mg per tablet Commonly known as:  Iven Ding Take 1 Tab by mouth four (4) times daily for 30 days. As needed for breakthrough pain; fill on/after 12/21/13  Indications: PAIN  
  
 * HYDROcodone-acetaminophen 7.5-325 mg per tablet Commonly known as:  Iven Ding Take 1-2 Tabs by mouth nightly as needed for Pain. Max Daily Amount: 2 Tabs.  
  
 lidocaine 5 % Commonly known as:  Shayna Leonardo Apply patch to the affected area for 12 hours a day and remove for 12 hours a day. loratadine 10 mg tablet Commonly known as:  CLARITIN  
TAKE 1 TAB BY MOUTH ONCE A DAY FOR 30 DAYS  
  
 LOTEMAX 0.5 % Drpg Generic drug:  loteprednol etabonate APPLY 1 DROP INTO LEFT EYE FOUR TIMES A DAY  
  
 montelukast 10 mg tablet Commonly known as:  SINGULAIR  
TAKE 1 TABLET ONCE A DAY * morphine CR 15 mg CR tablet Commonly known as:  MS CONTIN Take 1 Tab by mouth every six (6) hours for 30 days. For chronic pain; fill on/after 8/20/13   Indications: CHRONIC PAIN, NEUROPATHIC PAIN, SEVERE PAIN  
  
 * morphine CR 15 mg CR tablet Commonly known as:  MS CONTIN Take 1 Tab by mouth every six (6) hours for 30 days. For chronic pain; fill on/after 11/20/13  Indications: CHRONIC PAIN, NEUROPATHIC PAIN, SEVERE PAIN  
  
 * morphine CR 15 mg CR tablet Commonly known as:  MS CONTIN Take 1 Tab by mouth every six (6) hours for 30 days. For chronic pain; fill on/after 12/21/13  Indications: CHRONIC PAIN, NEUROPATHIC PAIN, SEVERE PAIN  
  
 omeprazole 40 mg capsule Commonly known as:  PRILOSEC Take 40 mg by mouth daily. * pregabalin 225 mg capsule Commonly known as:  Cleo Dousman Take 1 Cap by mouth two (2) times a day. Max Daily Amount: 450 mg.  
  
 * pregabalin 300 mg capsule Commonly known as:  Orville Flash Take 1 Cap by mouth two (2) times a day. Max Daily Amount: 600 mg.  
  
 * pregabalin 225 mg capsule Commonly known as:  Orville Flash Take 1 Cap by mouth two (2) times a day. Max Daily Amount: 450 mg.  
  
 raNITIdine 150 mg tablet Commonly known as:  ZANTAC TAKE 1 TAB BY MOUTH TWO TIMES A DAY FOR 30 DAYS  
  
 traMADol 50 mg tablet Commonly known as:  ULTRAM  
Take 1 Tab by mouth two (2) times a day. Max Daily Amount: 100 mg.  
  
 valACYclovir 500 mg tablet Commonly known as:  VALTREX  
500 mg.  
  
 venlafaxine 75 mg tablet Commonly known as:  [facility] Take 1 Tab by mouth three (3) times daily for 30 days. VITAMIN B-12 1,000 mcg sublingual tablet Generic drug:  cyanocobalamin Take 1,000 mcg by mouth daily. VITAMIN D2 50,000 unit capsule Generic drug:  ergocalciferol Take 50,000 Units by mouth every seven (7) days. WELLBUTRIN 100 mg tablet Generic drug:  buPROPion Take 100 mg by mouth daily. * Notice: This list has 8 medication(s) that are the same as other medications prescribed for you. Read the directions carefully, and ask your doctor or other care provider to review them with you. Prescriptions Printed Refills  
 pregabalin (LYRICA) 225 mg capsule 1 Sig: Take 1 Cap by mouth two (2) times a day. Max Daily Amount: 450 mg.  
 Class: Print Route: Oral  
  
Follow-up Instructions Return in about 3 months (around 5/8/2017). Introducing [facility]! Dear Farzaneh: Thank you for requesting a [employer] account. Our records indicate that you already have an active [employer] account. You can access your account anytime at [email or web address] Did you know that you can access your hospital and ER discharge instructions at any time in [employer]?   You can also review all of your test results from your hospital stay or ER visit. Additional Information If you have questions, please visit the Frequently Asked Questions section of the [employer] website at [email or web address] Remember, [employer] is NOT to be used for urgent needs. For medical emergencies, dial 911. Now available from your iPhone and Android! Please provide this summary of care documentation to your next provider. Your primary care clinician is listed as GURVINDER Boswell. If you have any questions after today's visit, please call [phone].

## 2017-03-29 ENCOUNTER — OFFICE VISIT (OUTPATIENT)
Dept: ORTHOPEDIC SURGERY | Age: 57
End: 2017-03-29

## 2017-03-29 VITALS
SYSTOLIC BLOOD PRESSURE: 144 MMHG | TEMPERATURE: 96.5 F | HEART RATE: 63 BPM | HEIGHT: 67 IN | DIASTOLIC BLOOD PRESSURE: 79 MMHG | BODY MASS INDEX: 20.4 KG/M2 | WEIGHT: 130 LBS

## 2017-03-29 DIAGNOSIS — M77.42 METATARSALGIA OF LEFT FOOT: ICD-10-CM

## 2017-03-29 DIAGNOSIS — M51.36 OTHER INTERVERTEBRAL DISC DEGENERATION, LUMBAR REGION: ICD-10-CM

## 2017-03-29 DIAGNOSIS — M25.551 BILATERAL HIP PAIN: ICD-10-CM

## 2017-03-29 DIAGNOSIS — M77.41 METATARSALGIA, RIGHT FOOT: ICD-10-CM

## 2017-03-29 DIAGNOSIS — M51.36 DDD (DEGENERATIVE DISC DISEASE), LUMBAR: Primary | ICD-10-CM

## 2017-03-29 DIAGNOSIS — M79.671 FOOT PAIN, BILATERAL: ICD-10-CM

## 2017-03-29 DIAGNOSIS — M16.11 PRIMARY OSTEOARTHRITIS OF RIGHT HIP: ICD-10-CM

## 2017-03-29 DIAGNOSIS — M54.17 RADICULOPATHY, LUMBOSACRAL REGION: ICD-10-CM

## 2017-03-29 DIAGNOSIS — M79.672 FOOT PAIN, BILATERAL: ICD-10-CM

## 2017-03-29 DIAGNOSIS — R22.42 FOOT MASS, LEFT: ICD-10-CM

## 2017-03-29 DIAGNOSIS — M25.552 BILATERAL HIP PAIN: ICD-10-CM

## 2017-03-29 DIAGNOSIS — S92.414D CLOSED NONDISPLACED FRACTURE OF PROXIMAL PHALANX OF RIGHT GREAT TOE WITH ROUTINE HEALING, SUBSEQUENT ENCOUNTER: ICD-10-CM

## 2017-03-29 DIAGNOSIS — M16.12 PRIMARY OSTEOARTHRITIS OF LEFT HIP: ICD-10-CM

## 2017-03-29 RX ORDER — BUPIVACAINE HYDROCHLORIDE 2.5 MG/ML
4 INJECTION, SOLUTION EPIDURAL; INFILTRATION; INTRACAUDAL ONCE
Qty: 4 ML | Refills: 0
Start: 2017-03-29 | End: 2017-03-29

## 2017-03-29 RX ORDER — BETAMETHASONE SODIUM PHOSPHATE AND BETAMETHASONE ACETATE 3; 3 MG/ML; MG/ML
3 INJECTION, SUSPENSION INTRA-ARTICULAR; INTRALESIONAL; INTRAMUSCULAR; SOFT TISSUE ONCE
Qty: 0.5 ML | Refills: 0
Start: 2017-03-29 | End: 2017-03-29 | Stop reason: SDUPTHER

## 2017-03-29 RX ORDER — HYDROCODONE BITARTRATE AND ACETAMINOPHEN 7.5; 325 MG/1; MG/1
1-2 TABLET ORAL
Qty: 60 TAB | Refills: 0 | Status: SHIPPED | OUTPATIENT
Start: 2017-03-29 | End: 2018-07-02 | Stop reason: SDUPTHER

## 2017-03-29 RX ORDER — BUPIVACAINE HYDROCHLORIDE 2.5 MG/ML
4 INJECTION, SOLUTION EPIDURAL; INFILTRATION; INTRACAUDAL ONCE
Qty: 4 ML | Refills: 0
Start: 2017-03-29 | End: 2017-03-29 | Stop reason: SDUPTHER

## 2017-03-29 RX ORDER — BETAMETHASONE SODIUM PHOSPHATE AND BETAMETHASONE ACETATE 3; 3 MG/ML; MG/ML
3 INJECTION, SUSPENSION INTRA-ARTICULAR; INTRALESIONAL; INTRAMUSCULAR; SOFT TISSUE ONCE
Qty: 0.5 ML | Refills: 0
Start: 2017-03-29 | End: 2017-03-29

## 2017-03-29 NOTE — MR AVS SNAPSHOT
Visit Information Date & Time Provider Department Dept. Phone Encounter #  
 3/29/2017  9:00 AM Brayan Manzanares, [address] [facility] [phone] [id number] Follow-up Instructions Return if symptoms worsen or fail to improve. Your Appointments 5/8/2017 10:25 AM  
Follow Up with Raghav Shaw MD  
[facility] - SPECIALTY [facility] ([facility]) Appt Note: BACK 3 MO FU 20.00 COPAY  
 [address] [facility] [id number] [facility] Upcoming Health Maintenance Date Due Hepatitis C Screening 1960 DTaP/Tdap/Td series (1 - Tdap) 5/4/1980 PAP AKA CERVICAL CYTOLOGY 5/4/1980 BREAST CANCER SCRN MAMMOGRAM 5/4/2009 FOBT Q 1 YEAR AGE 50-75 5/4/2009 INFLUENZA AGE 9 TO ADULT 8/1/2016 Allergies as of 3/29/2017  Review Complete On: 3/29/2017 By: Brayan Manzanares MD  
  
 Severity Noted Reaction Type Reactions Codeine    Nausea and Vomiting Morphine    Nausea Only PO is ok just not IV Current Immunizations  Never Reviewed No immunizations on file. Not reviewed this visit You Were Diagnosed With   
  
 Codes Comments DDD (degenerative disc disease), lumbar    -  Primary ICD-10-CM: M51.36 
ICD-9-CM: 722.52 Other intervertebral disc degeneration, lumbar region     ICD-10-CM: M51.36 
ICD-9-CM: 722.52 Radiculopathy, lumbosacral region     ICD-10-CM: M54.17 ICD-9-CM: 724.4 Bilateral hip pain     ICD-10-CM: M25.551, M25.552 ICD-9-CM: 719.45 Primary osteoarthritis of left hip     ICD-10-CM: M16.12 
ICD-9-CM: 715.15 Mild Primary osteoarthritis of right hip     ICD-10-CM: M16.11 
ICD-9-CM: 715.15 Minimal  
 Closed nondisplaced fracture of proximal phalanx of right great toe with routine healing, subsequent encounter     ICD-10-CM: A48.851O ICD-9-CM: V54.19   
 Metatarsalgia, right foot     ICD-10-CM: M77.41 
ICD-9-CM: 726.70 Metatarsalgia of left foot     ICD-10-CM: M77.42 
ICD-9-CM: 726.70 Foot mass, left     ICD-10-CM: R22.42 
ICD-9-CM: 782. 2 Foot pain, bilateral     ICD-10-CM: M79.671, T54.497 ICD-9-CM: 729.5 Vitals OB Status Smoking Status Hysterectomy Former Smoker Preferred Pharmacy Pharmacy Name Phone [id number] [place], [address] Your Updated Medication List  
  
   
This list is accurate as of: 3/29/17  9:51 AM.  Always use your most recent med list.  
  
  
  
  
 ATIVAN 1 mg tablet Generic drug:  LORazepam  
Take 1 mg by mouth nightly as needed for Anxiety. atorvastatin 20 mg tablet Commonly known as:  LIPITOR Take 10 mg by mouth daily. * betamethasone 6 mg/mL injection Commonly known as:  CELESTONE SOLUSPAN  
0.5 mL by Intra artICUlar route once for 1 dose. * betamethasone 6 mg/mL injection Commonly known as:  CELESTONE SOLUSPAN  
0.5 mL by Intra artICUlar route once for 1 dose. * bupivacaine (PF) 0.25 % (2.5 mg/mL) injection Commonly known as:  MARCAINE (PF)  
4 mL by Intra artICUlar route once for 1 dose. * bupivacaine (PF) 0.25 % (2.5 mg/mL) injection Commonly known as:  MARCAINE (PF)  
4 mL by Intra artICUlar route once for 1 dose. diclofenac 1.3 % Pt12 Commonly known as:  FLECTOR  
1 Patch by TransDERmal route two (2) times a day for 30 days. FISH OIL PO Take  by mouth.  
  
 * HYDROcodone-acetaminophen 7.5-325 mg per tablet Commonly known as:  Iven Ding Take 1 Tab by mouth four (4) times daily for 30 days. As needed for breakthrough pain; fill on/after 12/21/13  Indications: PAIN  
  
 * HYDROcodone-acetaminophen 7.5-325 mg per tablet Commonly known as:  Iven Ding Take 1-2 Tabs by mouth nightly as needed for Pain. Max Daily Amount: 2 Tabs. * HYDROcodone-acetaminophen 7.5-325 mg per tablet Commonly known as:  Laquita Arts Take 1-2 Tabs by mouth nightly as needed for Pain. Max Daily Amount: 2 Tabs.  
  
 lidocaine 5 % Commonly known as:  Dufm Fusi Apply patch to the affected area for 12 hours a day and remove for 12 hours a day. loratadine 10 mg tablet Commonly known as:  CLARITIN  
TAKE 1 TAB BY MOUTH ONCE A DAY FOR 30 DAYS  
  
 LOTEMAX 0.5 % Drpg Generic drug:  loteprednol etabonate APPLY 1 DROP INTO LEFT EYE FOUR TIMES A DAY  
  
 montelukast 10 mg tablet Commonly known as:  SINGULAIR  
TAKE 1 TABLET ONCE A DAY * morphine CR 15 mg CR tablet Commonly known as:  MS CONTIN Take 1 Tab by mouth every six (6) hours for 30 days. For chronic pain; fill on/after 8/20/13   Indications: CHRONIC PAIN, NEUROPATHIC PAIN, SEVERE PAIN  
  
 * morphine CR 15 mg CR tablet Commonly known as:  MS CONTIN Take 1 Tab by mouth every six (6) hours for 30 days. For chronic pain; fill on/after 11/20/13  Indications: CHRONIC PAIN, NEUROPATHIC PAIN, SEVERE PAIN  
  
 * morphine CR 15 mg CR tablet Commonly known as:  MS CONTIN Take 1 Tab by mouth every six (6) hours for 30 days. For chronic pain; fill on/after 12/21/13  Indications: CHRONIC PAIN, NEUROPATHIC PAIN, SEVERE PAIN  
  
 omeprazole 40 mg capsule Commonly known as:  PRILOSEC Take 40 mg by mouth daily. * pregabalin 225 mg capsule Commonly known as:  Pratima Creeks Take 1 Cap by mouth two (2) times a day. Max Daily Amount: 450 mg.  
  
 * pregabalin 300 mg capsule Commonly known as:  Pratima Creeks Take 1 Cap by mouth two (2) times a day. Max Daily Amount: 600 mg.  
  
 * pregabalin 225 mg capsule Commonly known as:  Pratima Creeks Take 1 Cap by mouth two (2) times a day. Max Daily Amount: 450 mg.  
  
 raNITIdine 150 mg tablet Commonly known as:  ZANTAC TAKE 1 TAB BY MOUTH TWO TIMES A DAY FOR 30 DAYS  
  
 traMADol 50 mg tablet Commonly known as:  Fab Fusi  
 Take 1 Tab by mouth two (2) times a day. Max Daily Amount: 100 mg.  
  
 valACYclovir 500 mg tablet Commonly known as:  VALTREX  
500 mg.  
  
 venlafaxine 75 mg tablet Commonly known as:  [facility] Take 1 Tab by mouth three (3) times daily for 30 days. VITAMIN B-12 1,000 mcg sublingual tablet Generic drug:  cyanocobalamin Take 1,000 mcg by mouth daily. VITAMIN D2 50,000 unit capsule Generic drug:  ergocalciferol Take 50,000 Units by mouth every seven (7) days. WELLBUTRIN 100 mg tablet Generic drug:  buPROPion Take 100 mg by mouth daily. * Notice: This list has 13 medication(s) that are the same as other medications prescribed for you. Read the directions carefully, and ask your doctor or other care provider to review them with you. Prescriptions Printed Refills HYDROcodone-acetaminophen (NORCO) 7.5-325 mg per tablet 0 Sig: Take 1-2 Tabs by mouth nightly as needed for Pain. Max Daily Amount: 2 Tabs. Class: Print Route: Oral  
  
We Performed the Following BETAMETHASONE ACETATE & SODIUM PHOSPHATE INJECTION 3 MG EA. [ HCP] BETAMETHASONE ACETATE & SODIUM PHOSPHATE INJECTION 3 MG EA. [ HCP] THER/PROPH/DIAG INJECTION, SUBCUT/IM [id number] CPT(R)] THER/PROPH/DIAG INJECTION, SUBCUT/IM [id number] CPT(R)] Follow-up Instructions Return if symptoms worsen or fail to improve. Patient Instructions Learning About How to Have a Healthy Back What causes back pain? Back pain is often caused by overuse, strain, or injury. For example, people often hurt their backs playing sports or working in the yard, being jolted in a car accident, or lifting something too heavy. Aging plays a part too. Your bones and muscles tend to lose strength as you age, which makes injury more likely.  The spongy discs between the bones of the spine (vertebrae) may suffer from wear and tear and no longer provide enough cushion between the bones. A disc that bulges or breaks open (herniated disc) can press on nerves, causing back pain. In some people, back pain is the result of arthritis, broken vertebrae caused by bone loss (osteoporosis), illness, or a spine problem. Although most people have back pain at one time or another, there are steps you can take to make it less likely. How can you have a healthy back? Reduce stress on your back through good posture Slumping or slouching alone may not cause low back pain. But after the back has been strained or injured, bad posture can make pain worse. · Sleep in a position that maintains your back's normal curves and on a mattress that feels comfortable. Sleep on your side with a pillow between your knees, or sleep on your back with a pillow under your knees. These positions can reduce strain on your back. · Stand and sit up straight. \"Good posture\" generally means your ears, shoulders, and hips are in a straight line. · If you must stand for a long time, put one foot on a stool, ledge, or box. Switch feet every now and then. · Sit in a chair that is low enough to let you place both feet flat on the floor with both knees nearly level with your hips. If your chair or desk is too high, use a footrest to raise your knees. Place a small pillow, a rolled-up towel, or a lumbar roll in the curve of your back if you need extra support. · Try a kneeling chair, which helps tilt your hips forward. This takes pressure off your lower back. · Try sitting on an exercise ball. It can rock from side to side, which helps keep your back loose. · When driving, keep your knees nearly level with your hips. Sit straight, and drive with both hands on the steering wheel. Your arms should be in a slightly bent position. Reduce stress on your back through careful lifting · Squat down, bending at the hips and knees only.  If you need to, put one knee to the floor and extend your other knee in front of you, bent at a right angle (half kneeling). · Press your chest straight forward. This helps keep your upper back straight while keeping a slight arch in your low back. · Hold the load as close to your body as possible, at the level of your belly button (navel). · Use your feet to change direction, taking small steps. · Lead with your hips as you change direction. Keep your shoulders in line with your hips as you move. · Set down your load carefully, squatting with your knees and hips only. Exercise and stretch your back · Do some exercise on most days of the week, if your doctor says it is okay. You can walk, run, swim, or cycle. · Stretch your back muscles. Here are a few exercises to try: ¨ Lie on your back, and gently pull one bent knee to your chest. Put that foot back on the floor, and then pull the other knee to your chest. 
¨ Do pelvic tilts. Lie on your back with your knees bent. Tighten your stomach muscles. Pull your belly button (navel) in and up toward your ribs. You should feel like your back is pressing to the floor and your hips and pelvis are slightly lifting off the floor. Hold for 6 seconds while breathing smoothly. ¨ Sit with your back flat against a wall. · Keep your core muscles strong. The muscles of your back, belly (abdomen), and buttocks support your spine. ¨ Pull in your belly and imagine pulling your navel toward your spine. Hold this for 6 seconds, then relax. Remember to keep breathing normally as you tense your muscles. ¨ Do curl-ups. Always do them with your knees bent. Keep your low back on the floor, and curl your shoulders toward your knees using a smooth, slow motion. Keep your arms folded across your chest. If this bothers your neck, try putting your hands behind your neck (not your head), with your elbows spread apart. ¨ Lie on your back with your knees bent and your feet flat on the floor. Tighten your belly muscles, and then push with your feet and raise your buttocks up a few inches. Hold this position 6 seconds as you continue to breathe normally, then lower yourself slowly to the floor. Repeat 8 to 12 times. ¨ If you like group exercise, try Pilates or yoga. These classes have poses that strengthen the core muscles. Lead a healthy lifestyle · Stay at a healthy weight to avoid strain on your back. · Do not smoke. Smoking increases the risk of osteoporosis, which weakens the spine. If you need help quitting, talk to your doctor about stop-smoking programs and medicines. These can increase your chances of quitting for good. Where can you learn more? Go to http://arielLavantelucía.info/. Enter L315 in the search box to learn more about \"Learning About How to Have a Healthy Back. \" Current as of: May 23, 2016 Content Version: 11.2 © 2669-0879 [employer]. Care instructions adapted under license by [employer] (which disclaims liability or warranty for this information). If you have questions about a medical condition or this instruction, always ask your healthcare professional. [place] [address] any warranty or liability for your use of this information. Hip Arthritis: Exercises Your Care Instructions Here are some examples of exercises for hip arthritis. Start each exercise slowly. Ease off the exercise if you start to have pain. Your doctor or physical therapist will tell you when you can start these exercises and which ones will work best for you. How to do the exercises Straight-leg raises to the outside 1. Lie on your side, with your affected hip on top. 2. Tighten the front thigh muscles of your top leg to keep your knee straight. 3. Keep your hip and your leg straight in line with the rest of your body, and keep your knee pointing forward. Do not drop your hip back. 4. Lift your top leg straight up toward the ceiling, about 12 inches off the floor. Hold for about 6 seconds, then slowly lower your leg. 5. Repeat 8 to 12 times. 6. Switch legs and repeat steps 1 through 5, even if only one hip is sore. Straight-leg raises to the inside 1. Lie on your side with your affected hip on the floor. 2. You can either prop up your other leg on a chair, or you can bend that knee and put that foot in front of your other knee. Do not drop your hip back. 3. Tighten the muscles on the front thigh of your bottom leg to straighten that knee. 4. Keep your kneecap pointing forward and your leg straight, and lift your bottom leg up toward the ceiling about 6 inches. Hold for about 6 seconds, then lower slowly. 5. Repeat 8 to 12 times. 6. Switch legs and repeat steps 1 through 5, even if only one hip is sore. Hip hike 1. Stand sideways on the bottom step of a staircase, and hold on to the banister or wall. 2. Keeping both knees straight, lift your good leg off the step and let it hang down. Then hike your good hip up to the same level as your affected hip or a little higher. 3. Repeat 8 to 12 times. 4. Switch legs and repeat steps 1 through 3, even if only one hip is sore. Bridging 1. Lie on your back with both knees bent. Your knees should be bent about 90 degrees. 2. Then push your feet into the floor, squeeze your buttocks, and lift your hips off the floor until your shoulders, hips, and knees are all in a straight line. 3. Hold for about 6 seconds as you continue to breathe normally, and then slowly lower your hips back down to the floor and rest for up to 10 seconds. 4. Repeat 8 to 12 times. Hamstring stretch (lying down) 1. Lie flat on your back with your legs straight. If you feel discomfort in your back, place a small towel roll under your lower back.  
2. Holding the back of your affected leg, lift your leg straight up and toward your body until you feel a stretch at the back of your thigh. 3. Hold the stretch for at least 30 seconds. 4. Repeat 2 to 4 times. 5. Switch legs and repeat steps 1 through 4, even if only one hip is sore. Standing quadriceps stretch 1. If you are not steady on your feet, hold on to a chair, counter, or wall. You can also lie on your stomach or your side to do this exercise. 2. Bend the knee of the leg you want to stretch, and reach behind you to grab the front of your foot or ankle with the hand on the same side. For example, if you are stretching your right leg, use your right hand. 3. Keeping your knees next to each other, pull your foot toward your buttock until you feel a gentle stretch across the front of your hip and down the front of your thigh. Your knee should be pointed directly to the ground, and not out to the side. 4. Hold the stretch for at least 15 to 30 seconds. 5. Repeat 2 to 4 times. 6. Switch legs and repeat steps 1 through 5, even if only one hip is sore. Hip rotator stretch 1. Lie on your back with both knees bent and your feet flat on the floor. 2. Put the ankle of your affected leg on your opposite thigh near your knee. 3. Use your hand to gently push your knee away from your body until you feel a gentle stretch around your hip. 4. Hold the stretch for 15 to 30 seconds. 5. Repeat 2 to 4 times. 6. Repeat steps 1 through 5, but this time use your hand to gently pull your knee toward your opposite shoulder. 7. Switch legs and repeat steps 1 through 6, even if only one hip is sore. Knee-to-chest 
 
1. Lie on your back with your knees bent and your feet flat on the floor. 2. Bring your affected leg to your chest, keeping the other foot flat on the floor (or keeping the other leg straight, whichever feels better on your lower back). 3. Keep your lower back pressed to the floor. Hold for at least 15 to 30 seconds. 4. Relax, and lower the knee to the starting position. 5. Repeat 2 to 4 times. 6. Switch legs and repeat steps 1 through 5, even if only one hip is sore. 7. To get more stretch, put your other leg flat on the floor while pulling your knee to your chest. 
Clamshell 1. Lie on your side, with your affected hip on top. Keep your feet and knees together and your knees bent. 2. Raise your top knee, but keep your feet together. Do not let your hips roll back. Your legs should open up like a clamshell. 3. Hold for 6 seconds. 4. Slowly lower your knee back down. Rest for 10 seconds. 5. Repeat 8 to 12 times. 6. Switch legs and repeat steps 1 through 5, even if only one hip is sore. Follow-up care is a key part of your treatment and safety. Be sure to make and go to all appointments, and call your doctor if you are having problems. It's also a good idea to know your test results and keep a list of the medicines you take. Where can you learn more? Go to http://ariel-lucía.info/. Enter L422 in the search box to learn more about \"Hip Arthritis: Exercises. \" Current as of: May 23, 2016 Content Version: 11.2 © 2175-2495 [employer]. Care instructions adapted under license by [employer] (which disclaims liability or warranty for this information). If you have questions about a medical condition or this instruction, always ask your healthcare professional. [place] [address] any warranty or liability for your use of this information. Introducing [facility] [place] [facility]! Dear Farzaneh: Thank you for requesting a [employer] account. Our records indicate that you already have an active [employer] account. You can access your account anytime at [email or web address] Did you know that you can access your hospital and ER discharge instructions at any time in [employer]? You can also review all of your test results from your hospital stay or ER visit. Additional Information If you have questions, please visit the Frequently Asked Questions section of the [employer] website at [email or web address] Remember, [employer] is NOT to be used for urgent needs. For medical emergencies, dial 911. Now available from your iPhone and Android! Please provide this summary of care documentation to your next provider. Your primary care clinician is listed as GURVINDER Champagne. If you have any questions after today's visit, please call [phone].

## 2017-03-29 NOTE — PATIENT INSTRUCTIONS
Learning About How to Have a Healthy Back  What causes back pain? Back pain is often caused by overuse, strain, or injury. For example, people often hurt their backs playing sports or working in the yard, being jolted in a car accident, or lifting something too heavy. Aging plays a part too. Your bones and muscles tend to lose strength as you age, which makes injury more likely. The spongy discs between the bones of the spine (vertebrae) may suffer from wear and tear and no longer provide enough cushion between the bones. A disc that bulges or breaks open (herniated disc) can press on nerves, causing back pain. In some people, back pain is the result of arthritis, broken vertebrae caused by bone loss (osteoporosis), illness, or a spine problem. Although most people have back pain at one time or another, there are steps you can take to make it less likely. How can you have a healthy back? Reduce stress on your back through good posture  Slumping or slouching alone may not cause low back pain. But after the back has been strained or injured, bad posture can make pain worse. · Sleep in a position that maintains your back's normal curves and on a mattress that feels comfortable. Sleep on your side with a pillow between your knees, or sleep on your back with a pillow under your knees. These positions can reduce strain on your back. · Stand and sit up straight. \"Good posture\" generally means your ears, shoulders, and hips are in a straight line. · If you must stand for a long time, put one foot on a stool, ledge, or box. Switch feet every now and then. · Sit in a chair that is low enough to let you place both feet flat on the floor with both knees nearly level with your hips. If your chair or desk is too high, use a footrest to raise your knees. Place a small pillow, a rolled-up towel, or a lumbar roll in the curve of your back if you need extra support.   · Try a kneeling chair, which helps tilt your hips forward. This takes pressure off your lower back. · Try sitting on an exercise ball. It can rock from side to side, which helps keep your back loose. · When driving, keep your knees nearly level with your hips. Sit straight, and drive with both hands on the steering wheel. Your arms should be in a slightly bent position. Reduce stress on your back through careful lifting  · Squat down, bending at the hips and knees only. If you need to, put one knee to the floor and extend your other knee in front of you, bent at a right angle (half kneeling). · Press your chest straight forward. This helps keep your upper back straight while keeping a slight arch in your low back. · Hold the load as close to your body as possible, at the level of your belly button (navel). · Use your feet to change direction, taking small steps. · Lead with your hips as you change direction. Keep your shoulders in line with your hips as you move. · Set down your load carefully, squatting with your knees and hips only. Exercise and stretch your back  · Do some exercise on most days of the week, if your doctor says it is okay. You can walk, run, swim, or cycle. · Stretch your back muscles. Here are a few exercises to try:  Campos  on your back, and gently pull one bent knee to your chest. Put that foot back on the floor, and then pull the other knee to your chest.  ¨ Do pelvic tilts. Lie on your back with your knees bent. Tighten your stomach muscles. Pull your belly button (navel) in and up toward your ribs. You should feel like your back is pressing to the floor and your hips and pelvis are slightly lifting off the floor. Hold for 6 seconds while breathing smoothly. ¨ Sit with your back flat against a wall. · Keep your core muscles strong. The muscles of your back, belly (abdomen), and buttocks support your spine. ¨ Pull in your belly and imagine pulling your navel toward your spine. Hold this for 6 seconds, then relax.  Remember to keep breathing normally as you tense your muscles. ¨ Do curl-ups. Always do them with your knees bent. Keep your low back on the floor, and curl your shoulders toward your knees using a smooth, slow motion. Keep your arms folded across your chest. If this bothers your neck, try putting your hands behind your neck (not your head), with your elbows spread apart. ¨ Lie on your back with your knees bent and your feet flat on the floor. Tighten your belly muscles, and then push with your feet and raise your buttocks up a few inches. Hold this position 6 seconds as you continue to breathe normally, then lower yourself slowly to the floor. Repeat 8 to 12 times. ¨ If you like group exercise, try Pilates or yoga. These classes have poses that strengthen the core muscles. Lead a healthy lifestyle  · Stay at a healthy weight to avoid strain on your back. · Do not smoke. Smoking increases the risk of osteoporosis, which weakens the spine. If you need help quitting, talk to your doctor about stop-smoking programs and medicines. These can increase your chances of quitting for good. Where can you learn more? Go to http://[employer].info/. Enter L315 in the search box to learn more about \"Learning About How to Have a Healthy Back. \"  Current as of: May 23, 2016  Content Version: 11.2  © 9368-6401 [employer]. Care instructions adapted under license by [employer] (which disclaims liability or warranty for this information). If you have questions about a medical condition or this instruction, always ask your healthcare professional. [place] [address] any warranty or liability for your use of this information. Hip Arthritis: Exercises  Your Care Instructions  Here are some examples of exercises for hip arthritis. Start each exercise slowly. Ease off the exercise if you start to have pain.   Your doctor or physical therapist will tell you when you can start these exercises and which ones will work best for you. How to do the exercises  Straight-leg raises to the outside    1. Lie on your side, with your affected hip on top. 2. Tighten the front thigh muscles of your top leg to keep your knee straight. 3. Keep your hip and your leg straight in line with the rest of your body, and keep your knee pointing forward. Do not drop your hip back. 4. Lift your top leg straight up toward the ceiling, about 12 inches off the floor. Hold for about 6 seconds, then slowly lower your leg. 5. Repeat 8 to 12 times. 6. Switch legs and repeat steps 1 through 5, even if only one hip is sore. Straight-leg raises to the inside    1. Lie on your side with your affected hip on the floor. 2. You can either prop up your other leg on a chair, or you can bend that knee and put that foot in front of your other knee. Do not drop your hip back. 3. Tighten the muscles on the front thigh of your bottom leg to straighten that knee. 4. Keep your kneecap pointing forward and your leg straight, and lift your bottom leg up toward the ceiling about 6 inches. Hold for about 6 seconds, then lower slowly. 5. Repeat 8 to 12 times. 6. Switch legs and repeat steps 1 through 5, even if only one hip is sore. Hip hike    1. Stand sideways on the bottom step of a staircase, and hold on to the banister or wall. 2. Keeping both knees straight, lift your good leg off the step and let it hang down. Then hike your good hip up to the same level as your affected hip or a little higher. 3. Repeat 8 to 12 times. 4. Switch legs and repeat steps 1 through 3, even if only one hip is sore. Bridging    1. Lie on your back with both knees bent. Your knees should be bent about 90 degrees. 2. Then push your feet into the floor, squeeze your buttocks, and lift your hips off the floor until your shoulders, hips, and knees are all in a straight line.   3. Hold for about 6 seconds as you continue to breathe normally, and then slowly lower your hips back down to the floor and rest for up to 10 seconds. 4. Repeat 8 to 12 times. Hamstring stretch (lying down)    1. Lie flat on your back with your legs straight. If you feel discomfort in your back, place a small towel roll under your lower back. 2. Holding the back of your affected leg, lift your leg straight up and toward your body until you feel a stretch at the back of your thigh. 3. Hold the stretch for at least 30 seconds. 4. Repeat 2 to 4 times. 5. Switch legs and repeat steps 1 through 4, even if only one hip is sore. Standing quadriceps stretch    1. If you are not steady on your feet, hold on to a chair, counter, or wall. You can also lie on your stomach or your side to do this exercise. 2. Bend the knee of the leg you want to stretch, and reach behind you to grab the front of your foot or ankle with the hand on the same side. For example, if you are stretching your right leg, use your right hand. 3. Keeping your knees next to each other, pull your foot toward your buttock until you feel a gentle stretch across the front of your hip and down the front of your thigh. Your knee should be pointed directly to the ground, and not out to the side. 4. Hold the stretch for at least 15 to 30 seconds. 5. Repeat 2 to 4 times. 6. Switch legs and repeat steps 1 through 5, even if only one hip is sore. Hip rotator stretch    1. Lie on your back with both knees bent and your feet flat on the floor. 2. Put the ankle of your affected leg on your opposite thigh near your knee. 3. Use your hand to gently push your knee away from your body until you feel a gentle stretch around your hip. 4. Hold the stretch for 15 to 30 seconds. 5. Repeat 2 to 4 times. 6. Repeat steps 1 through 5, but this time use your hand to gently pull your knee toward your opposite shoulder. 7. Switch legs and repeat steps 1 through 6, even if only one hip is sore. Knee-to-chest    1.  Lie on your back with your knees bent and your feet flat on the floor. 2. Bring your affected leg to your chest, keeping the other foot flat on the floor (or keeping the other leg straight, whichever feels better on your lower back). 3. Keep your lower back pressed to the floor. Hold for at least 15 to 30 seconds. 4. Relax, and lower the knee to the starting position. 5. Repeat 2 to 4 times. 6. Switch legs and repeat steps 1 through 5, even if only one hip is sore. 7. To get more stretch, put your other leg flat on the floor while pulling your knee to your chest.  Clamshell    1. Lie on your side, with your affected hip on top. Keep your feet and knees together and your knees bent. 2. Raise your top knee, but keep your feet together. Do not let your hips roll back. Your legs should open up like a clamshell. 3. Hold for 6 seconds. 4. Slowly lower your knee back down. Rest for 10 seconds. 5. Repeat 8 to 12 times. 6. Switch legs and repeat steps 1 through 5, even if only one hip is sore. Follow-up care is a key part of your treatment and safety. Be sure to make and go to all appointments, and call your doctor if you are having problems. It's also a good idea to know your test results and keep a list of the medicines you take. Where can you learn more? Go to http://ariel-lucía.info/. Enter J963 in the search box to learn more about \"Hip Arthritis: Exercises. \"  Current as of: May 23, 2016  Content Version: 11.2  © 0242-9305 [employer]. Care instructions adapted under license by [employer] (which disclaims liability or warranty for this information). If you have questions about a medical condition or this instruction, always ask your healthcare professional. [address] any warranty or liability for your use of this information.

## 2017-03-29 NOTE — PROGRESS NOTES
Patient: Flor Freeman                MRN: [id number]       SSN: [id number]  YOB: 1960        AGE: 61 y.o. SEX: female    PCP: Lucas Carlos NP  03/29/17    CC:  BILATERAL HIP AND LOW BACK PAIN    HISTORY:  Farzaneh Ellington is a 61 y.o. female who is seen for bilateral hip (L>R) and low back pain. She reports increased hip and low back pain since 2012. She states that her  noted that she has walked funny for years. She says that she smashed her right fourth toe last night and reports a h/o two fractures in her right great toe. She has undergone bilateral foot operations by Dr. Kathy Rosen. She is currently being seen by Dr. Dianne Wallace for low back pain who prescribes her Lyrica and referred her for hip pain. She takes Lyrica for back pain daily. She reports that she was discharged from pain management because she was self-medicating with marijuana. She says that she has been black-balled and felt like she was being over-medicated in pain management. She was previously in aquatic therapy at the [facility] in [place]. She responded to a previous right iliolumbar cortisone injection. Pain Assessment  3/29/2017   Location of Pain Hip   Location Modifiers Left;Right   Severity of Pain 9   Quality of Pain Aching; Throbbing   Duration of Pain -   Frequency of Pain Intermittent   Aggravating Factors Stairs; Walking;Standing;Exercise   Aggravating Factors Comment -   Limiting Behavior Yes   Relieving Factors Nothing   Relieving Factors Comment -   Result of Injury Yes   Work-Related Injury No   Type of Injury Fall     Occupation, etc:  Ms. Concetta Sutton is not diabetic or hypertensive. Current weight is 130 pounds. She goes by either \"Mrs. Smith\" or just \"June. \"  She has a rescue pitbull. She states that she is taking a mission trip with [employer] in June to [facility] in [place] to supply a village.   She lives with her , dog, three cats, 30 chickens, 3 ducks, two pigs while one recently had babies in [place]. She states that her  will take care of all of her animals while she is gone on her trip. Weight Metrics 3/29/2017 2/8/2017 2/1/2017 1/11/2017 12/7/2016 11/2/2016 10/5/2016   Weight 130 lb 139 lb 3.2 oz 139 lb 139 lb 135 lb 6.4 oz 130 lb 3.2 oz 128 lb   BMI 20.36 kg/m2 21.8 kg/m2 21.77 kg/m2 21.77 kg/m2 21.21 kg/m2 20.39 kg/m2 20.05 kg/m2     Patient Active Problem List   Diagnosis Code    Lower back pain M54.5    Unstable gait R26.81    Lumbago M54.5    Sacroiliitis, not elsewhere classified ([facility] [address] M46.1    Degeneration of lumbar or lumbosacral intervertebral disc M51.37    Enthesopathy of hip region M76.899    Encounter for long-term (current) use of other medications Z79.899    Weight loss, unintentional R63.4    Low back pain without sciatica M54.5    Spondylosis without myelopathy or radiculopathy, lumbar region M47.816    Lumbosacral neuritis M54.17    DDD (degenerative disc disease), lumbar M51.36    Other intervertebral disc degeneration, lumbar region M51.36    Radiculopathy, lumbosacral region M54.17    Low back pain M54.5    Low back pain, non-specific M54.5     REVIEW OF SYSTEMS: All Below are Negative except: See HPI   Constitutional: negative for fever, chills, and weight loss. Cardiovascular: negative for chest pain, claudication, leg swelling, SOB, HAYES   Gastrointestinal: Negative for pain, N/V/C/D, Blood in stool or urine, dysuria,  hematuria, incontinence, pelvic pain. Musculoskeletal: See HPI   Neurological: Negative for dizziness and weakness. Negative for headaches, Visual changes, confusion, seizures   Phychiatric/Behavioral: Negative for depression, memory loss, substance  abuse. Extremities: Negative for hair changes, rash, or skin lesion changes.    Hematologic: Negative for bleeding problems, bruising, pallor or swollen lymph  nodes   Peripheral Vascular: No calf pain, no circulation deficits. Social History     Social History    Marital status:      Spouse name: N/A    Number of children: N/A    Years of education: N/A     Occupational History    Not on file. Social History Main Topics    Smoking status: Former Smoker     Packs/day: 0.50     Years: 35.00     Types: Cigarettes    Smokeless tobacco: Never Used    Alcohol use Yes      Comment: rarely    Drug use: Yes     Special: Marijuana    Sexual activity: No     Other Topics Concern    Not on file     Social History Narrative    ** Merged History Encounter **           Allergies   Allergen Reactions    Codeine Nausea and Vomiting    Morphine Nausea Only     PO is ok just not IV       Current Outpatient Prescriptions   Medication Sig    pregabalin (LYRICA) 225 mg capsule Take 1 Cap by mouth two (2) times a day. Max Daily Amount: 450 mg.    HYDROcodone-acetaminophen (NORCO) 7.5-325 mg per tablet Take 1-2 Tabs by mouth nightly as needed for Pain. Max Daily Amount: 2 Tabs.  pregabalin (LYRICA) 300 mg capsule Take 1 Cap by mouth two (2) times a day. Max Daily Amount: 600 mg.    montelukast (SINGULAIR) 10 mg tablet TAKE 1 TABLET ONCE A DAY    pregabalin (LYRICA) 225 mg capsule Take 1 Cap by mouth two (2) times a day. Max Daily Amount: 450 mg.    valACYclovir (VALTREX) 500 mg tablet 500 mg.  LOTEMAX 0.5 % drpg APPLY 1 DROP INTO LEFT EYE FOUR TIMES A DAY    loratadine (CLARITIN) 10 mg tablet TAKE 1 TAB BY MOUTH ONCE A DAY FOR 30 DAYS    ranitidine (ZANTAC) 150 mg tablet TAKE 1 TAB BY MOUTH TWO TIMES A DAY FOR 30 DAYS    lidocaine (LIDODERM) 5 % Apply patch to the affected area for 12 hours a day and remove for 12 hours a day.  traMADol (ULTRAM) 50 mg tablet Take 1 Tab by mouth two (2) times a day. Max Daily Amount: 100 mg.    omeprazole (PRILOSEC) 40 mg capsule Take 40 mg by mouth daily.  ergocalciferol (VITAMIN D2) 50,000 unit capsule Take 50,000 Units by mouth every seven (7) days.     atorvastatin (LIPITOR) 20 mg tablet Take 10 mg by mouth daily.  LORazepam (ATIVAN) 1 mg tablet Take 1 mg by mouth nightly as needed for Anxiety.  cyanocobalamin (VITAMIN B-12) 1,000 mcg sublingual tablet Take 1,000 mcg by mouth daily.  buPROPion (WELLBUTRIN) 100 mg tablet Take 100 mg by mouth daily.  DOCOSAHEXANOIC ACID/EPA (FISH OIL PO) Take  by mouth.  HYDROcodone-acetaminophen (NORCO) 7.5-325 mg per tablet Take 1 Tab by mouth four (4) times daily for 30 days. As needed for breakthrough pain; fill on/after 12/21/13  Indications: PAIN    morphine CR (MS CONTIN) 15 mg CR tablet Take 1 Tab by mouth every six (6) hours for 30 days. For chronic pain; fill on/after 12/21/13  Indications: CHRONIC PAIN, NEUROPATHIC PAIN, SEVERE PAIN    morphine CR (MS CONTIN) 15 mg CR tablet Take 1 Tab by mouth every six (6) hours for 30 days. For chronic pain; fill on/after 11/20/13  Indications: CHRONIC PAIN, NEUROPATHIC PAIN, SEVERE PAIN    morphine CR (MS CONTIN) 15 mg CR tablet Take 1 Tab by mouth every six (6) hours for 30 days. For chronic pain; fill on/after 8/20/13    Indications: CHRONIC PAIN, NEUROPATHIC PAIN, SEVERE PAIN    venlafaxine (EFFEXOR) 75 mg tablet Take 1 Tab by mouth three (3) times daily for 30 days.  diclofenac epolamine (FLECTOR) 1.3 % PtMd transdermal patch 1 Patch by TransDERmal route two (2) times a day for 30 days. No current facility-administered medications for this visit. PHYSICAL EXAMINATION:  There were no vitals taken for this visit.    ORTHO EXAMINATION:  Examination Lumbar   Skin Intact   Tenderness +, bilateral iliolumbar   Tightness +, bilateral iliolumbar   Lordosis Normal   Kyphosis N/A   Scoliosis -   Flexion Fingertips to mid shin   Extension Minimal   Knee reflexes 2+   Ankle reflexes 1+   Straight leg raise -   Calf tenderness -     Examination Right hip Left hip   Skin Intact Intact   External Rotation ROM 20 20   Internal Rotation ROM 30 30   Trochanteric tenderness - - Hip flexion contracture - -   Antalgic gait - -   Trendelenberg sign - -   Lumbar tenderness - -   Straight leg raise - -   Calf tenderness - -   Neurovascular Intact Intact     PROCEDURE:  After discussing treatment options, patient's bilateral iliolumbar regions were injected with 4 cc Marcaine and 1/2 cc Celestone. Chart reviewed for the following:   Jose Bazzi MD, have reviewed the History, Physical and updated the Allergic reactions for [facility]     TIME OUT performed immediately prior to start of procedure:  Jose Bazzi MD, have performed the following reviews on June Lai Shelling prior to the start of the procedure:            * Patient was identified by name and date of birth   * Agreement on procedure being performed was verified  * Risks and Benefits explained to the patient  * Procedure site verified and marked as necessary  * Patient was positioned for comfort  * Consent was obtained     Time: 9:45 AM     Date of procedure: 3/29/2017    Procedure performed by:  Jil Lennox, MD    Ms. [facility] tolerated the procedure well with no complications. MRI LUMBAR SPINE WO CONT 11/29/16  IMPRESSION:  1. Mild multilevel disc disease showing little or no significant change compared with the previous examination. No neural impingement. 2. Extensive fatty infiltration of end plates of M3-4 indicative of chronic DDD, not appreciably changed. 3. Very mild discogenic endplate marrow edema on the left at L4-5 has decreased since the prior exam.    RADIOGRAPHS:  XR JOHN HIPS 2/1/17  IMPRESSION:  No fractures, mild joint space narrowing on left, minimal joint space narrowing on right, small lateral osteophytes present. IMPRESSION:      ICD-10-CM ICD-9-CM    1. DDD (degenerative disc disease), lumbar M51.36 722.52 betamethasone (CELESTONE SOLUSPAN) 6 mg/mL injection      BETAMETHASONE ACETATE & SODIUM PHOSPHATE INJECTION 3 MG EA.       THER/PROPH/DIAG INJECTION, SUBCUT/IM      bupivacaine, PF, (MARCAINE, PF,) 0.25 % (2.5 mg/mL) injection      betamethasone (CELESTONE SOLUSPAN) 6 mg/mL injection      BETAMETHASONE ACETATE & SODIUM PHOSPHATE INJECTION 3 MG EA. THER/PROPH/DIAG INJECTION, SUBCUT/IM      bupivacaine, PF, (MARCAINE, PF,) 0.25 % (2.5 mg/mL) injection      HYDROcodone-acetaminophen (NORCO) 7.5-325 mg per tablet   2. Other intervertebral disc degeneration, lumbar region M51.36 722.52 betamethasone (CELESTONE SOLUSPAN) 6 mg/mL injection      BETAMETHASONE ACETATE & SODIUM PHOSPHATE INJECTION 3 MG EA. THER/PROPH/DIAG INJECTION, SUBCUT/IM      bupivacaine, PF, (MARCAINE, PF,) 0.25 % (2.5 mg/mL) injection      betamethasone (CELESTONE SOLUSPAN) 6 mg/mL injection      BETAMETHASONE ACETATE & SODIUM PHOSPHATE INJECTION 3 MG EA. THER/PROPH/DIAG INJECTION, SUBCUT/IM      bupivacaine, PF, (MARCAINE, PF,) 0.25 % (2.5 mg/mL) injection      HYDROcodone-acetaminophen (NORCO) 7.5-325 mg per tablet   3. Radiculopathy, lumbosacral region M54.17 724.4 betamethasone (CELESTONE SOLUSPAN) 6 mg/mL injection      BETAMETHASONE ACETATE & SODIUM PHOSPHATE INJECTION 3 MG EA. THER/PROPH/DIAG INJECTION, SUBCUT/IM      bupivacaine, PF, (MARCAINE, PF,) 0.25 % (2.5 mg/mL) injection      betamethasone (CELESTONE SOLUSPAN) 6 mg/mL injection      BETAMETHASONE ACETATE & SODIUM PHOSPHATE INJECTION 3 MG EA. THER/PROPH/DIAG INJECTION, SUBCUT/IM      bupivacaine, PF, (MARCAINE, PF,) 0.25 % (2.5 mg/mL) injection      HYDROcodone-acetaminophen (NORCO) 7.5-325 mg per tablet   4. Bilateral hip pain M25.551 719.45 HYDROcodone-acetaminophen (NORCO) 7.5-325 mg per tablet    M25.552     5. Primary osteoarthritis of left hip M16.12 715.15 HYDROcodone-acetaminophen (NORCO) 7.5-325 mg per tablet    Mild   6. Primary osteoarthritis of right hip M16.11 715.15 HYDROcodone-acetaminophen (NORCO) 7.5-325 mg per tablet    Minimal   7.  Closed nondisplaced fracture of proximal phalanx of right great toe with routine healing, subsequent encounter S92.414D V54.19 HYDROcodone-acetaminophen (NORCO) 7.5-325 mg per tablet   8. Metatarsalgia, right foot M77.41 726.70 HYDROcodone-acetaminophen (NORCO) 7.5-325 mg per tablet   9. Metatarsalgia of left foot M77.42 726.70 HYDROcodone-acetaminophen (NORCO) 7.5-325 mg per tablet   10. Foot mass, left R22.42 782.2 HYDROcodone-acetaminophen (NORCO) 7.5-325 mg per tablet   11. Foot pain, bilateral M79.671 729.5 HYDROcodone-acetaminophen (NORCO) 7.5-325 mg per tablet    M79.672       PLAN:  After discussing treatment options, patient's bilateral iliolumbar regions were injected with 4 cc Marcaine and 1/2 cc Celestone. She will follow up as needed. There is no need for surgery at this time. She will continue to follow up with Dr. García Santana at the spine center for low back pain. She will start pain management in [place] at the end of April, 2017 as scheduled. Until then she will take Norco for the pain as needed at night.       Scribed by [employer] ([address]) as dictated by Mounika Ballesteros MD

## 2017-05-02 RX ORDER — PREGABALIN 225 MG/1
225 CAPSULE ORAL 2 TIMES DAILY
Qty: 60 CAP | Refills: 0 | OUTPATIENT
Start: 2017-05-02 | End: 2020-07-31 | Stop reason: ALTCHOICE

## 2017-05-02 NOTE — TELEPHONE ENCOUNTER
Called and was unable to leave a voice mail to inform patient that her prescription has been called into her pharmacy.

## 2017-05-02 NOTE — TELEPHONE ENCOUNTER
Patient called back regarding refill request from earlier today - she is due to run out of medication today and Dr. Henrry Rose advised her before not to miss a dose. Patient just wants to ensure that we can fill for her today.   Please advise patient at [phone]

## 2017-05-02 NOTE — TELEPHONE ENCOUNTER
Called and was unable to leave a voice mail to inform patient that her prescription for Lyrica has been called into her [facility] pharmacy in [place]. And she should be able to  her prescription today.

## 2017-05-02 NOTE — TELEPHONE ENCOUNTER
PHONE IN RX    Last Visit: 02/08/2017 with MD Natasha Kam    Next Appointment: 05/08/2017 with MD Natasha Kam   Previous Refill Encounters: 02/08/2017 per MD Natasha Kam #[id number] with 1 refill     Requested Prescriptions     Pending Prescriptions Disp Refills    pregabalin (LYRICA) 225 mg capsule 60 Cap 0     Sig: Take 1 Cap by mouth two (2) times a day. Max Daily Amount: 450 mg.

## 2017-05-08 ENCOUNTER — OFFICE VISIT (OUTPATIENT)
Dept: ORTHOPEDIC SURGERY | Age: 57
End: 2017-05-08

## 2017-05-08 VITALS
WEIGHT: 146.2 LBS | RESPIRATION RATE: 16 BRPM | DIASTOLIC BLOOD PRESSURE: 88 MMHG | HEART RATE: 71 BPM | SYSTOLIC BLOOD PRESSURE: 118 MMHG | HEIGHT: 67 IN | BODY MASS INDEX: 22.95 KG/M2

## 2017-05-08 DIAGNOSIS — M51.36 OTHER INTERVERTEBRAL DISC DEGENERATION, LUMBAR REGION: ICD-10-CM

## 2017-05-08 DIAGNOSIS — M54.17 RADICULOPATHY, LUMBOSACRAL REGION: ICD-10-CM

## 2017-05-08 DIAGNOSIS — M47.816 SPONDYLOSIS WITHOUT MYELOPATHY OR RADICULOPATHY, LUMBAR REGION: Primary | ICD-10-CM

## 2017-05-08 RX ORDER — ATOVAQUONE AND PROGUANIL HYDROCHLORIDE 250; 100 MG/1; MG/1
TABLET, FILM COATED ORAL
Refills: 0 | COMMUNITY
Start: 2017-05-04

## 2017-05-08 RX ORDER — LIDOCAINE 50 MG/G
PATCH TOPICAL
Qty: 30 EACH | Refills: 2 | Status: SHIPPED | OUTPATIENT
Start: 2017-05-08 | End: 2018-07-02 | Stop reason: SDUPTHER

## 2017-05-08 NOTE — PROGRESS NOTES
[facility]  [address], [place]   Phone: [phone]  Fax: [phone]        PROGRESS NOTE      HISTORY OF PRESENT ILLNESS:  The patient is a 61 y.o. female and was seen today for follow up of low back pain extending posterolaterally into the BLE to the knees. Pt states pain no longer extends below the knees. Any sustained movement exacerbates pain. She reports onset of back pain [id number]. Patient also has chronic bilateral hip pain (R>L). Pt reports being discharged from pain management. Pt has been having issues with esophagitis for which she sees Dr. Jose Samayoa. He has taken her off of Motrin and been prescribed Prilosec and Zantac with benefit. She reports no relief with Lidoderm patches. Note from Dr. Tor Lopez dated 2/1/17 indicating patient was seen for bilateral hip and low back pain. Of note, she suffered an injury since I last saw patient with two fractures of the right great toe. At that time, he performed a trigger point injection to her right lower back and indicated she had minimal joint space narrowing at the right hip. Per patient, Dr. Tor Lopez referred patient to pain management as she declined surgery. She was prescribed Vicodin by Dr. Tor Lopez which she previously was receiving through her PCP. Pt previously failed CYMBALTA, NEURONTIN, and TOPAMAX. She tolerated increased Lyrica 300 mg BID without additional relief and causes drowsiness. Pt completes her HEP daily. She previously received left SI injection without relief. She attended to physical therapy and was switched to aquatic therapy with significant relief. Pt denies change in bowel or bladder habits. Pt denies fever, weight loss, or skin changes. Preliminary reading of lumbar plain films revealed disc space narrowing at L3-L4. Anterior osteophytes at L3-L4. No acute pathology identified. Lumbar spine MRI dated 11/30/16 reviewed.  Per report, mild multilevel disc disease showing little or no significant change compared with the previous examination. No neural impingement. Extensive fatty infiltration of end plates of D8-7 indicative of chronic DDD, not appreciably changed. Very mild discogenic endplate marrow edema on the left at L4-5 has decreased since the prior exam. At her last clinical appointment, she reduced her Lyrica back to 225 mg BID as she noted no improvement with 300 mg BID and was given refills. The patient returns today with pain location and distribution remain unchanged. She continues to rate pain 8/10. Pt states she will be traveling to [place] for a mission trip. Note from Dr. Bell Bi dated 3/29/17 bilateral hip and low back pain. At that time, patient underwent a bursa injection. Of note, patient will start seeing Dr. Stacia Ochoa in April. Pt has seen Dr. Stacia Ochoa but it appears as though she will not be continuing secondary to multiple failed UDS. She admits completing her HEP 3x/week. Pt is compliant with Lyrica 224 mg BID.  reviewed. Past Medical History:   Diagnosis Date    Carotid duplex 11/18/2013    No significant occlusive disease bilaterally.  Chronic pain     GERD (gastroesophageal reflux disease)     Hiatal hernia     History of myocardial perfusion scan 11/12/2013    No evidence of ongoing ischemia. Mild inferior defect likely artifact. No RWMA. EF 57%. Neg EKG on pharm stress test.    Hypercholesterolemia     Lower back pain     Sciatic nerve pain     Sciatica     Unstable gait         Social History     Social History    Marital status:      Spouse name: N/A    Number of children: N/A    Years of education: N/A     Occupational History    Not on file.      Social History Main Topics    Smoking status: Former Smoker     Packs/day: 0.50     Years: 35.00     Types: Cigarettes    Smokeless tobacco: Never Used    Alcohol use Yes      Comment: rarely    Drug use: Yes     Special: Marijuana    Sexual activity: No     Other Topics Concern    Not on file     Social History Narrative    ** Merged History Encounter **            Current Outpatient Prescriptions   Medication Sig Dispense Refill    lidocaine (LIDODERM) 5 % Apply patch to the affected area for 12 hours a day and remove for 12 hours a day. 30 Each 2    pregabalin (LYRICA) 225 mg capsule Take 1 Cap by mouth two (2) times a day. Max Daily Amount: 450 mg. 60 Cap 0    HYDROcodone-acetaminophen (NORCO) 7.5-325 mg per tablet Take 1-2 Tabs by mouth nightly as needed for Pain. Max Daily Amount: 2 Tabs. 60 Tab 0    HYDROcodone-acetaminophen (NORCO) 7.5-325 mg per tablet Take 1-2 Tabs by mouth nightly as needed for Pain. Max Daily Amount: 2 Tabs. 60 Tab 0    montelukast (SINGULAIR) 10 mg tablet TAKE 1 TABLET ONCE A DAY  0    pregabalin (LYRICA) 225 mg capsule Take 1 Cap by mouth two (2) times a day. Max Daily Amount: 450 mg. 60 Cap 1    loratadine (CLARITIN) 10 mg tablet TAKE 1 TAB BY MOUTH ONCE A DAY FOR 30 DAYS  6    ranitidine (ZANTAC) 150 mg tablet TAKE 1 TAB BY MOUTH TWO TIMES A DAY FOR 30 DAYS  3    traMADol (ULTRAM) 50 mg tablet Take 1 Tab by mouth two (2) times a day. Max Daily Amount: 100 mg. 50 Tab 0    omeprazole (PRILOSEC) 40 mg capsule Take 40 mg by mouth daily.  ergocalciferol (VITAMIN D2) 50,000 unit capsule Take 50,000 Units by mouth every seven (7) days.  atorvastatin (LIPITOR) 20 mg tablet Take 10 mg by mouth daily.  LORazepam (ATIVAN) 1 mg tablet Take 1 mg by mouth nightly as needed for Anxiety.  cyanocobalamin (VITAMIN B-12) 1,000 mcg sublingual tablet Take 1,000 mcg by mouth daily.  buPROPion (WELLBUTRIN) 100 mg tablet Take 100 mg by mouth daily.  DOCOSAHEXANOIC ACID/EPA (FISH OIL PO) Take  by mouth.  atovaquone-proguanil (MALARONE) 250-100 mg per tablet TAKE 1 TAB BY MOUTH ONCE A DAY FOR 42 DAYS  0    pregabalin (LYRICA) 300 mg capsule Take 1 Cap by mouth two (2) times a day.  Max Daily Amount: 600 mg. 60 Cap 1    valACYclovir (VALTREX) 500 mg tablet 500 mg.  LOTEMAX 0.5 % drpg APPLY 1 DROP INTO LEFT EYE FOUR TIMES A DAY  3    lidocaine (LIDODERM) 5 % Apply patch to the affected area for 12 hours a day and remove for 12 hours a day. 1 Each 0    HYDROcodone-acetaminophen (NORCO) 7.5-325 mg per tablet Take 1 Tab by mouth four (4) times daily for 30 days. As needed for breakthrough pain; fill on/after 12/21/13  Indications: PAIN 120 Tab 0    morphine CR (MS CONTIN) 15 mg CR tablet Take 1 Tab by mouth every six (6) hours for 30 days. For chronic pain; fill on/after 12/21/13  Indications: CHRONIC PAIN, NEUROPATHIC PAIN, SEVERE PAIN 120 Tab 0    morphine CR (MS CONTIN) 15 mg CR tablet Take 1 Tab by mouth every six (6) hours for 30 days. For chronic pain; fill on/after 11/20/13  Indications: CHRONIC PAIN, NEUROPATHIC PAIN, SEVERE PAIN 120 Tab 0    morphine CR (MS CONTIN) 15 mg CR tablet Take 1 Tab by mouth every six (6) hours for 30 days. For chronic pain; fill on/after 8/20/13    Indications: CHRONIC PAIN, NEUROPATHIC PAIN, SEVERE PAIN 120 Tab 0    venlafaxine (EFFEXOR) 75 mg tablet Take 1 Tab by mouth three (3) times daily for 30 days. 90 Tab 5    diclofenac epolamine (FLECTOR) 1.3 % PtMd transdermal patch 1 Patch by TransDERmal route two (2) times a day for 30 days. 60 Patch 5       Allergies   Allergen Reactions    Codeine Nausea and Vomiting    Morphine Nausea Only     PO is ok just not IV           PHYSICAL EXAMINATION    Visit Vitals    /88 (BP 1 Location: Right arm, BP Patient Position: Sitting)    Pulse 71    Resp 16    Ht 5' 7\" (1.702 m)    Wt 146 lb 3.2 oz (66.3 kg)    BMI 22.9 kg/m2       CONSTITUTIONAL: NAD, A&O x 3  SENSATION: Intact to light touch throughout  RANGE OF MOTION: The patient has full passive range of motion in all four extremities.   MOTOR:  Straight Leg Raise: Negative, bilateral               Hip Flex Knee Ext Knee Flex Ankle DF GTE Ankle PF Tone Right +4/5 +4/5 +4/5 +4/5 +4/5 +4/5 +4/5   Left +4/5 +4/5 +4/5 +4/5 +4/5 +4/5 +4/5       ASSESSMENT   June was seen today for back pain and leg pain. Diagnoses and all orders for this visit:    Spondylosis without myelopathy or radiculopathy, lumbar region    Radiculopathy, lumbosacral region    Other intervertebral disc degeneration, lumbar region    Other orders  -     lidocaine (LIDODERM) 5 %; Apply patch to the affected area for 12 hours a day and remove for 12 hours a day. IMPRESSION AND PLAN:  In light of the fact that patient will be traveling to [place], we will attempt to have her Lyrica 225 mg BID approved for an early refill. Patient has used Lidoderm patches in the past and will be give a Rx for them. I recommend she increase the frequency of HEP to daily. I will see the patient back in 3 month's time or earlier if needed. Written by Carla Bryson, as dictated by Kingsley Yanez MD  I examined the patient, reviewed and agree with the note.

## 2017-05-08 NOTE — MR AVS SNAPSHOT
Visit Information Date & Time Provider Department Dept. Phone Encounter #  
 5/8/2017 10:25 AM Janneth Swenson MD [facility] - SPECIALTY [facility] [place] [phone] [id number] Follow-up Instructions Return in about 3 months (around 8/8/2017). Upcoming Health Maintenance Date Due Hepatitis C Screening 1960 DTaP/Tdap/Td series (1 - Tdap) 5/4/1980 PAP AKA CERVICAL CYTOLOGY 5/4/1980 BREAST CANCER SCRN MAMMOGRAM 5/4/2009 FOBT Q 1 YEAR AGE 50-75 5/4/2009 INFLUENZA AGE 9 TO ADULT 8/1/2017 Allergies as of 5/8/2017  Review Complete On: 5/8/2017 By: Janneth Swenson MD  
  
 Severity Noted Reaction Type Reactions Codeine    Nausea and Vomiting Morphine    Nausea Only PO is ok just not IV Current Immunizations  Never Reviewed No immunizations on file. Not reviewed this visit You Were Diagnosed With   
  
 Codes Comments Spondylosis without myelopathy or radiculopathy, lumbar region    -  Primary ICD-10-CM: M47.816 ICD-9-CM: 721.3 Radiculopathy, lumbosacral region     ICD-10-CM: M54.17 ICD-9-CM: 724.4 Other intervertebral disc degeneration, lumbar region     ICD-10-CM: M51.36 
ICD-9-CM: 722.52 Vitals BP Pulse Resp Height(growth percentile) Weight(growth percentile) BMI  
 118/88 (BP 1 Location: Right arm, BP Patient Position: Sitting) 71 16 5' 7\" (1.702 m) 146 lb 3.2 oz (66.3 kg) 22.9 kg/m2 OB Status Smoking Status Hysterectomy Former Smoker Vitals History BMI and BSA Data Body Mass Index Body Surface Area  
 22.9 kg/m 2 1.77 m 2 Preferred Pharmacy Pharmacy Name Phone [id number] [address] [place] [address] Your Updated Medication List  
  
   
This list is accurate as of: 5/8/17 12:14 PM.  Always use your most recent med list.  
  
  
  
  
 ATIVAN 1 mg tablet Generic drug:  LORazepam  
 Take 1 mg by mouth nightly as needed for Anxiety. atorvastatin 20 mg tablet Commonly known as:  LIPITOR Take 10 mg by mouth daily. atovaquone-proguanil 250-100 mg per tablet Commonly known as:  MALARONE  
TAKE 1 TAB BY MOUTH ONCE A DAY FOR 42 DAYS  
  
 diclofenac 1.3 % Pt12 Commonly known as:  FLECTOR  
1 Patch by TransDERmal route two (2) times a day for 30 days. FISH OIL PO Take  by mouth.  
  
 * HYDROcodone-acetaminophen 7.5-325 mg per tablet Commonly known as:  Scarlet Staple Take 1 Tab by mouth four (4) times daily for 30 days. As needed for breakthrough pain; fill on/after 12/21/13  Indications: PAIN  
  
 * HYDROcodone-acetaminophen 7.5-325 mg per tablet Commonly known as:  Scarlet Staple Take 1-2 Tabs by mouth nightly as needed for Pain. Max Daily Amount: 2 Tabs. * HYDROcodone-acetaminophen 7.5-325 mg per tablet Commonly known as:  Scarlet Staple Take 1-2 Tabs by mouth nightly as needed for Pain. Max Daily Amount: 2 Tabs. * lidocaine 5 % Commonly known as:  Carlos Flakito Apply patch to the affected area for 12 hours a day and remove for 12 hours a day. * lidocaine 5 % Commonly known as:  Carlos Flakito Apply patch to the affected area for 12 hours a day and remove for 12 hours a day. loratadine 10 mg tablet Commonly known as:  CLARITIN  
TAKE 1 TAB BY MOUTH ONCE A DAY FOR 30 DAYS  
  
 LOTEMAX 0.5 % Drpg Generic drug:  loteprednol etabonate APPLY 1 DROP INTO LEFT EYE FOUR TIMES A DAY  
  
 montelukast 10 mg tablet Commonly known as:  SINGULAIR  
TAKE 1 TABLET ONCE A DAY * morphine CR 15 mg CR tablet Commonly known as:  MS CONTIN Take 1 Tab by mouth every six (6) hours for 30 days. For chronic pain; fill on/after 8/20/13   Indications: CHRONIC PAIN, NEUROPATHIC PAIN, SEVERE PAIN  
  
 * morphine CR 15 mg CR tablet Commonly known as:  MS CONTIN Take 1 Tab by mouth every six (6) hours for 30 days.  For chronic pain; fill on/after 11/20/13  Indications: CHRONIC PAIN, NEUROPATHIC PAIN, SEVERE PAIN  
  
 * morphine CR 15 mg CR tablet Commonly known as:  MS CONTIN Take 1 Tab by mouth every six (6) hours for 30 days. For chronic pain; fill on/after 12/21/13  Indications: CHRONIC PAIN, NEUROPATHIC PAIN, SEVERE PAIN  
  
 omeprazole 40 mg capsule Commonly known as:  PRILOSEC Take 40 mg by mouth daily. * pregabalin 225 mg capsule Commonly known as:  Kimberly Mariya Take 1 Cap by mouth two (2) times a day. Max Daily Amount: 450 mg.  
  
 * pregabalin 300 mg capsule Commonly known as:  Kimberly Mariya Take 1 Cap by mouth two (2) times a day. Max Daily Amount: 600 mg.  
  
 * pregabalin 225 mg capsule Commonly known as:  Kimberly Mariya Take 1 Cap by mouth two (2) times a day. Max Daily Amount: 450 mg.  
  
 raNITIdine 150 mg tablet Commonly known as:  ZANTAC TAKE 1 TAB BY MOUTH TWO TIMES A DAY FOR 30 DAYS  
  
 traMADol 50 mg tablet Commonly known as:  ULTRAM  
Take 1 Tab by mouth two (2) times a day. Max Daily Amount: 100 mg.  
  
 valACYclovir 500 mg tablet Commonly known as:  VALTREX  
500 mg.  
  
 venlafaxine 75 mg tablet Commonly known as:  [facility] Take 1 Tab by mouth three (3) times daily for 30 days. VITAMIN B-12 1,000 mcg sublingual tablet Generic drug:  cyanocobalamin Take 1,000 mcg by mouth daily. VITAMIN D2 50,000 unit capsule Generic drug:  ergocalciferol Take 50,000 Units by mouth every seven (7) days. WELLBUTRIN 100 mg tablet Generic drug:  buPROPion Take 100 mg by mouth daily. * Notice: This list has 11 medication(s) that are the same as other medications prescribed for you. Read the directions carefully, and ask your doctor or other care provider to review them with you. Prescriptions Sent to Pharmacy Refills  
 lidocaine (LIDODERM) 5 % 2 Sig: Apply patch to the affected area for 12 hours a day and remove for 12 hours a day.   
 Class: Normal  
 Pharmacy[id number], [address] [place] [address] #: [phone] Follow-up Instructions Return in about 3 months (around 8/8/2017). Introducing [facility]! Dear Farzaneh: Thank you for requesting a [employer] account. Our records indicate that you already have an active [employer] account. You can access your account anytime at [email or web address] Did you know that you can access your hospital and ER discharge instructions at any time in [employer]? You can also review all of your test results from your hospital stay or ER visit. Additional Information If you have questions, please visit the Frequently Asked Questions section of the [employer] website at [email or web address] Remember, [employer] is NOT to be used for urgent needs. For medical emergencies, dial 911. Now available from your iPhone and Android! Please provide this summary of care documentation to your next provider. Your primary care clinician is listed as GURVINDER Patel. If you have any questions after today's visit, please call [phone].

## 2017-06-14 ENCOUNTER — OFFICE VISIT (OUTPATIENT)
Dept: ORTHOPEDIC SURGERY | Age: 57
End: 2017-06-14

## 2017-06-14 VITALS
HEIGHT: 67 IN | SYSTOLIC BLOOD PRESSURE: 126 MMHG | HEART RATE: 60 BPM | WEIGHT: 146 LBS | TEMPERATURE: 97.7 F | BODY MASS INDEX: 22.91 KG/M2 | DIASTOLIC BLOOD PRESSURE: 71 MMHG

## 2017-06-14 DIAGNOSIS — M16.12 PRIMARY OSTEOARTHRITIS OF LEFT HIP: Primary | ICD-10-CM

## 2017-06-14 DIAGNOSIS — M54.17 RADICULOPATHY, LUMBOSACRAL REGION: ICD-10-CM

## 2017-06-14 DIAGNOSIS — M16.11 PRIMARY OSTEOARTHRITIS OF RIGHT HIP: ICD-10-CM

## 2017-06-14 DIAGNOSIS — M47.816 SPONDYLOSIS WITHOUT MYELOPATHY OR RADICULOPATHY, LUMBAR REGION: ICD-10-CM

## 2017-06-14 DIAGNOSIS — M25.552 BILATERAL HIP PAIN: ICD-10-CM

## 2017-06-14 DIAGNOSIS — M25.551 BILATERAL HIP PAIN: ICD-10-CM

## 2017-06-14 DIAGNOSIS — M54.50 LOW BACK PAIN WITHOUT SCIATICA, UNSPECIFIED BACK PAIN LATERALITY, UNSPECIFIED CHRONICITY: ICD-10-CM

## 2017-06-14 DIAGNOSIS — M51.36 DDD (DEGENERATIVE DISC DISEASE), LUMBAR: ICD-10-CM

## 2017-06-14 DIAGNOSIS — M51.36 OTHER INTERVERTEBRAL DISC DEGENERATION, LUMBAR REGION: ICD-10-CM

## 2017-06-14 RX ORDER — HYDROCODONE BITARTRATE AND ACETAMINOPHEN 7.5; 325 MG/1; MG/1
1-2 TABLET ORAL
Qty: 60 TAB | Refills: 0 | Status: SHIPPED | OUTPATIENT
Start: 2017-06-14 | End: 2018-07-02 | Stop reason: SDUPTHER

## 2017-06-14 RX ORDER — BETAMETHASONE SODIUM PHOSPHATE AND BETAMETHASONE ACETATE 3; 3 MG/ML; MG/ML
6 INJECTION, SUSPENSION INTRA-ARTICULAR; INTRALESIONAL; INTRAMUSCULAR; SOFT TISSUE ONCE
Qty: 1 ML | Refills: 0
Start: 2017-06-14 | End: 2017-06-14

## 2017-06-14 RX ORDER — BUPIVACAINE HYDROCHLORIDE 2.5 MG/ML
8 INJECTION, SOLUTION EPIDURAL; INFILTRATION; INTRACAUDAL ONCE
Qty: 8 ML | Refills: 0
Start: 2017-06-14 | End: 2017-06-14

## 2017-06-14 NOTE — PATIENT INSTRUCTIONS
Hip Arthritis: Exercises  Your Care Instructions  Here are some examples of exercises for hip arthritis. Start each exercise slowly. Ease off the exercise if you start to have pain. Your doctor or physical therapist will tell you when you can start these exercises and which ones will work best for you. How to do the exercises  Straight-leg raises to the outside    1. Lie on your side, with your affected hip on top. 2. Tighten the front thigh muscles of your top leg to keep your knee straight. 3. Keep your hip and your leg straight in line with the rest of your body, and keep your knee pointing forward. Do not drop your hip back. 4. Lift your top leg straight up toward the ceiling, about 12 inches off the floor. Hold for about 6 seconds, then slowly lower your leg. 5. Repeat 8 to 12 times. 6. Switch legs and repeat steps 1 through 5, even if only one hip is sore. Straight-leg raises to the inside    1. Lie on your side with your affected hip on the floor. 2. You can either prop up your other leg on a chair, or you can bend that knee and put that foot in front of your other knee. Do not drop your hip back. 3. Tighten the muscles on the front thigh of your bottom leg to straighten that knee. 4. Keep your kneecap pointing forward and your leg straight, and lift your bottom leg up toward the ceiling about 6 inches. Hold for about 6 seconds, then lower slowly. 5. Repeat 8 to 12 times. 6. Switch legs and repeat steps 1 through 5, even if only one hip is sore. Hip hike    1. Stand sideways on the bottom step of a staircase, and hold on to the banister or wall. 2. Keeping both knees straight, lift your good leg off the step and let it hang down. Then hike your good hip up to the same level as your affected hip or a little higher. 3. Repeat 8 to 12 times. 4. Switch legs and repeat steps 1 through 3, even if only one hip is sore. Bridging    1. Lie on your back with both knees bent.  Your knees should be bent about 90 degrees. 2. Then push your feet into the floor, squeeze your buttocks, and lift your hips off the floor until your shoulders, hips, and knees are all in a straight line. 3. Hold for about 6 seconds as you continue to breathe normally, and then slowly lower your hips back down to the floor and rest for up to 10 seconds. 4. Repeat 8 to 12 times. Hamstring stretch (lying down)    1. Lie flat on your back with your legs straight. If you feel discomfort in your back, place a small towel roll under your lower back. 2. Holding the back of your affected leg, lift your leg straight up and toward your body until you feel a stretch at the back of your thigh. 3. Hold the stretch for at least 30 seconds. 4. Repeat 2 to 4 times. 5. Switch legs and repeat steps 1 through 4, even if only one hip is sore. Standing quadriceps stretch    1. If you are not steady on your feet, hold on to a chair, counter, or wall. You can also lie on your stomach or your side to do this exercise. 2. Bend the knee of the leg you want to stretch, and reach behind you to grab the front of your foot or ankle with the hand on the same side. For example, if you are stretching your right leg, use your right hand. 3. Keeping your knees next to each other, pull your foot toward your buttock until you feel a gentle stretch across the front of your hip and down the front of your thigh. Your knee should be pointed directly to the ground, and not out to the side. 4. Hold the stretch for at least 15 to 30 seconds. 5. Repeat 2 to 4 times. 6. Switch legs and repeat steps 1 through 5, even if only one hip is sore. Hip rotator stretch    1. Lie on your back with both knees bent and your feet flat on the floor. 2. Put the ankle of your affected leg on your opposite thigh near your knee. 3. Use your hand to gently push your knee away from your body until you feel a gentle stretch around your hip.   4. Hold the stretch for 15 to 30 seconds. 5. Repeat 2 to 4 times. 6. Repeat steps 1 through 5, but this time use your hand to gently pull your knee toward your opposite shoulder. 7. Switch legs and repeat steps 1 through 6, even if only one hip is sore. Knee-to-chest    1. Lie on your back with your knees bent and your feet flat on the floor. 2. Bring your affected leg to your chest, keeping the other foot flat on the floor (or keeping the other leg straight, whichever feels better on your lower back). 3. Keep your lower back pressed to the floor. Hold for at least 15 to 30 seconds. 4. Relax, and lower the knee to the starting position. 5. Repeat 2 to 4 times. 6. Switch legs and repeat steps 1 through 5, even if only one hip is sore. 7. To get more stretch, put your other leg flat on the floor while pulling your knee to your chest.  Clamshell    1. Lie on your side, with your affected hip on top. Keep your feet and knees together and your knees bent. 2. Raise your top knee, but keep your feet together. Do not let your hips roll back. Your legs should open up like a clamshell. 3. Hold for 6 seconds. 4. Slowly lower your knee back down. Rest for 10 seconds. 5. Repeat 8 to 12 times. 6. Switch legs and repeat steps 1 through 5, even if only one hip is sore. Follow-up care is a key part of your treatment and safety. Be sure to make and go to all appointments, and call your doctor if you are having problems. It's also a good idea to know your test results and keep a list of the medicines you take. Where can you learn more? Go to http://ariel-lucía.info/. Enter J766 in the search box to learn more about \"Hip Arthritis: Exercises. \"  Current as of: May 23, 2016  Content Version: 11.2  © 4111-4363 [employer]. Care instructions adapted under license by [employer] (which disclaims liability or warranty for this information).  If you have questions about a medical condition or this instruction, always ask your healthcare professional. [address] any warranty or liability for your use of this information. Joint Injections: Care Instructions  Your Care Instructions  Joint injections are shots into a joint, such as the knee. They may be used to put in medicines, such as pain relievers. Or they can be used to take out fluid. Sometimes the fluid is tested in a lab. This can help find the cause of a joint problem. A corticosteroid, or steroid, shot is used to reduce inflammation in tendons or joints. It is often used to treat problems such as arthritis, tendinitis, and bursitis. Steroids can be injected directly into a painful, inflamed joint. They can also help reduce inflammation of a bursa. A bursa is a sac of fluid. It cushions and lubricates areas where tendons, ligaments, skin, muscles, or bones rub against each other. A steroid shot can sometimes help with short-term pain relief when other treatments haven't worked. If steroid shots help, pain may improve for weeks or months. Follow-up care is a key part of your treatment and safety. Be sure to make and go to all appointments, and call your doctor if you are having problems. It's also a good idea to know your test results and keep a list of the medicines you take. How can you care for yourself at home? · Put ice or a cold pack on the area for 10 to 20 minutes at a time. Put a thin cloth between the ice and your skin. · Take anti-inflammatory medicines to reduce pain, swelling, or inflammation. These include ibuprofen (Advil, Motrin) and naproxen (Aleve). Read and follow all instructions on the label. · Avoid strenuous activities for several days, especially those that put stress on the area where you got the shot. · If you have dressings over the area, keep them clean and dry. You may remove them when your doctor tells you to. When should you call for help?   Call your doctor now or seek immediate medical care if:  · You have signs of infection, such as:  ¨ Increased pain, swelling, warmth, or redness. ¨ Red streaks leading from the site. ¨ Pus draining from the site. ¨ A fever. Watch closely for changes in your health, and be sure to contact your doctor if you have any problems. Where can you learn more? Go to http://ariel-lucía.info/. Enter N616 in the search box to learn more about \"Joint Injections: Care Instructions. \"  Current as of: May 23, 2016  Content Version: 11.2  © 9130-2992 [employer]. Care instructions adapted under license by [employer] (which disclaims liability or warranty for this information). If you have questions about a medical condition or this instruction, always ask your healthcare professional. [address] any warranty or liability for your use of this information.

## 2017-06-14 NOTE — PROGRESS NOTES
Patient: Nadine Aceves                MRN: [id number]       SSN: [id number]  YOB: 1960        AGE: 61 y.o. SEX: female    PCP: Elena Pepper NP  06/14/17    Chief Complaint   Patient presents with    Hip Pain     Bilateral     HISTORY:  Farzaneh Collins is a 61 y.o. female who is seen for increased bilateral hip (L>R) pain. She claims the hip pain has increased recently. She was previously seen for low back pain. She reports increased hip and low back pain since 2012. She states that her  noted that she has walked funny for years. She previously smashed her right fourth toe and reports a h/o two fractures in her right great toe. She has undergone bilateral foot operations by Dr. Linn Lovett. She is currently being seen by Dr. Yoli Campos for low back pain who prescribes her Lyrica and referred her for hip pain. She takes Lyrica for back pain daily with relief. She reports that she was discharged from pain management because she was self-medicating with marijuana. She says that she has been black-balled and felt like she was being over-medicated in pain management. She was previously in aquatic therapy at the [facility] in [place]. She responded to a previous right iliolumbar cortisone injection. She reports that she will be starting a pain management program in [place] on July 7, 2017. Pain Assessment  5/8/2017   Location of Pain Back;Leg   Location Modifiers Left;Right   Severity of Pain 8   Quality of Pain Sharp; Aching   Duration of Pain Persistent   Frequency of Pain Constant   Aggravating Factors Bending;Stretching;Straightening;Exercise;Kneeling;Squatting;Standing;Walking;Stairs   Aggravating Factors Comment -   Limiting Behavior Yes   Relieving Factors Other (Comment)   Relieving Factors Comment PAIN MEDICATION   Result of Injury No   Work-Related Injury -   Type of Injury -     Occupation, etc:  Ms. Wilbert Collins is not diabetic or hypertensive.   Current weight is 146 pounds. She is 5'7\" tall. She goes by either \"Mrs. Smith\" or just \"June. \"  She has a rescue pitbull. She states just got back from taking a mission trip with [place] in June to [facility] in [place] to supply a village. 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 She lives with her , dog, three cats, 30 chickens, 3 ducks, two pigs while one recently had babies in [place]. She states one of her pigs recently had a miscarriage. She has a female pitbull named Александр. She has her own [employer] She states that her  will take care of all of her animals while she is gone on her trip. She is currently applying for Social Security benefits. She hopes to go back to [place] within the next two years but has trouble raising money to pay for her trip.      Weight Metrics 6/14/2017 5/8/2017 3/29/2017 2/8/2017 2/1/2017 1/11/2017 12/7/2016   Weight 146 lb 146 lb 3.2 oz 130 lb 139 lb 3.2 oz 139 lb 139 lb 135 lb 6.4 oz   BMI 22.87 kg/m2 22.9 kg/m2 20.36 kg/m2 21.8 kg/m2 21.77 kg/m2 21.77 kg/m2 21.21 kg/m2     Patient Active Problem List   Diagnosis Code    Lower back pain M54.5    Unstable gait R26.81    Lumbago M54.5    Sacroiliitis, not elsewhere classified ([facility] [address] M46.1    Degeneration of lumbar or lumbosacral intervertebral disc M51.37    Enthesopathy of hip region M76.899    Encounter for long-term (current) use of other medications Z79.899    Weight loss, unintentional R63.4    Low back pain without sciatica M54.5    Spondylosis without myelopathy or radiculopathy, lumbar region M47.816    Lumbosacral neuritis M54.17    DDD (degenerative disc disease), lumbar M51.36    Other intervertebral disc degeneration, lumbar region M51.36    Radiculopathy, lumbosacral region M54.17    Low back pain M54.5    Low back pain, non-specific M54.5     REVIEW OF SYSTEMS: All Below are Negative except: See HPI   Constitutional: negative for fever, chills, and weight loss. Cardiovascular: negative for chest pain, claudication, leg swelling, SOB, HAYES   Gastrointestinal: Negative for pain, N/V/C/D, Blood in stool or urine, dysuria,  hematuria, incontinence, pelvic pain. Musculoskeletal: See HPI   Neurological: Negative for dizziness and weakness. Negative for headaches, Visual changes, confusion, seizures   Phychiatric/Behavioral: Negative for depression, memory loss, substance  abuse. Extremities: Negative for hair changes, rash, or skin lesion changes. Hematologic: Negative for bleeding problems, bruising, pallor or swollen lymph  nodes   Peripheral Vascular: No calf pain, no circulation deficits. Social History     Social History    Marital status:      Spouse name: N/A    Number of children: N/A    Years of education: N/A     Occupational History    Not on file. Social History Main Topics    Smoking status: Former Smoker     Packs/day: 0.50     Years: 35.00     Types: Cigarettes    Smokeless tobacco: Never Used    Alcohol use Yes      Comment: rarely    Drug use: Yes     Special: Marijuana    Sexual activity: No     Other Topics Concern    Not on file     Social History Narrative    ** Merged History Encounter **           Allergies   Allergen Reactions    Codeine Nausea and Vomiting    Morphine Nausea Only     PO is ok just not IV       Current Outpatient Prescriptions   Medication Sig    atovaquone-proguanil (MALARONE) 250-100 mg per tablet TAKE 1 TAB BY MOUTH ONCE A DAY FOR 42 DAYS    montelukast (SINGULAIR) 10 mg tablet TAKE 1 TABLET ONCE A DAY    valACYclovir (VALTREX) 500 mg tablet 500 mg.     LOTEMAX 0.5 % drpg APPLY 1 DROP INTO LEFT EYE FOUR TIMES A DAY    loratadine (CLARITIN) 10 mg tablet TAKE 1 TAB BY MOUTH ONCE A DAY FOR 30 DAYS    ranitidine (ZANTAC) 150 mg tablet TAKE 1 TAB BY MOUTH TWO TIMES A DAY FOR 30 DAYS    omeprazole (PRILOSEC) 40 mg capsule Take 40 mg by mouth daily.  ergocalciferol (VITAMIN D2) 50,000 unit capsule Take 50,000 Units by mouth every seven (7) days.  atorvastatin (LIPITOR) 20 mg tablet Take 10 mg by mouth daily.  LORazepam (ATIVAN) 1 mg tablet Take 1 mg by mouth nightly as needed for Anxiety.  cyanocobalamin (VITAMIN B-12) 1,000 mcg sublingual tablet Take 1,000 mcg by mouth daily.  buPROPion (WELLBUTRIN) 100 mg tablet Take 100 mg by mouth daily.  diclofenac epolamine (FLECTOR) 1.3 % PtMd transdermal patch 1 Patch by TransDERmal route two (2) times a day for 30 days.  lidocaine (LIDODERM) 5 % Apply patch to the affected area for 12 hours a day and remove for 12 hours a day.  pregabalin (LYRICA) 225 mg capsule Take 1 Cap by mouth two (2) times a day. Max Daily Amount: 450 mg.    HYDROcodone-acetaminophen (NORCO) 7.5-325 mg per tablet Take 1-2 Tabs by mouth nightly as needed for Pain. Max Daily Amount: 2 Tabs.  HYDROcodone-acetaminophen (NORCO) 7.5-325 mg per tablet Take 1-2 Tabs by mouth nightly as needed for Pain. Max Daily Amount: 2 Tabs.  pregabalin (LYRICA) 300 mg capsule Take 1 Cap by mouth two (2) times a day. Max Daily Amount: 600 mg.  pregabalin (LYRICA) 225 mg capsule Take 1 Cap by mouth two (2) times a day. Max Daily Amount: 450 mg.    lidocaine (LIDODERM) 5 % Apply patch to the affected area for 12 hours a day and remove for 12 hours a day.  traMADol (ULTRAM) 50 mg tablet Take 1 Tab by mouth two (2) times a day. Max Daily Amount: 100 mg.  DOCOSAHEXANOIC ACID/EPA (FISH OIL PO) Take  by mouth.  HYDROcodone-acetaminophen (NORCO) 7.5-325 mg per tablet Take 1 Tab by mouth four (4) times daily for 30 days. As needed for breakthrough pain; fill on/after 12/21/13  Indications: PAIN    morphine CR (MS CONTIN) 15 mg CR tablet Take 1 Tab by mouth every six (6) hours for 30 days.  For chronic pain; fill on/after 12/21/13  Indications: CHRONIC PAIN, NEUROPATHIC PAIN, SEVERE PAIN    morphine CR (MS CONTIN) 15 mg CR tablet Take 1 Tab by mouth every six (6) hours for 30 days. For chronic pain; fill on/after 11/20/13  Indications: CHRONIC PAIN, NEUROPATHIC PAIN, SEVERE PAIN    morphine CR (MS CONTIN) 15 mg CR tablet Take 1 Tab by mouth every six (6) hours for 30 days. For chronic pain; fill on/after 8/20/13    Indications: CHRONIC PAIN, NEUROPATHIC PAIN, SEVERE PAIN    venlafaxine (EFFEXOR) 75 mg tablet Take 1 Tab by mouth three (3) times daily for 30 days. No current facility-administered medications for this visit. PHYSICAL EXAMINATION:  Visit Vitals    /71    Pulse 60    Temp 97.7 °F (36.5 °C) (Oral)    Ht 5' 7\" (1.702 m)    Wt 146 lb (66.2 kg)    BMI 22.87 kg/m2      ORTHO EXAMINATION:  Examination Lumbar   Skin Intact   Tenderness +, bilateral iliolumbar   Tightness +, bilateral iliolumbar   Lordosis Normal   Kyphosis N/A   Scoliosis -   Flexion Fingertips to mid shin   Extension Minimal   Knee reflexes 2+   Ankle reflexes 1+   Straight leg raise -   Calf tenderness -     Examination Right hip Left hip   Skin Intact Intact   External Rotation ROM 20 20   Internal Rotation ROM 30 30   Trochanteric tenderness +, posterior +, posterior   Hip flexion contracture - -   Antalgic gait - -   Trendelenberg sign - -   Lumbar tenderness - -   Straight leg raise - -   Calf tenderness - -   Neurovascular Intact Intact     PROCEDURE:  After discussing treatment options, patient's hips were injected with 4 cc Marcaine and 1/2 cc Celestone.     Chart reviewed for the following:   Ismael Cortez MD, have reviewed the History, Physical and updated the Allergic reactions for [facility]     TIME OUT performed immediately prior to start of procedure:  Ismael Cortez MD, have performed the following reviews on June Vicentean Capri prior to the start of the procedure:            * Patient was identified by name and date of birth   * Agreement on procedure being performed was verified  * Risks and Benefits explained to the patient  * Procedure site verified and marked as necessary  * Patient was positioned for comfort  * Consent was obtained     Time:10:40 AM     Date of procedure: 6/14/2017    Procedure performed by:  Racheal Pardo MD    Ms. Wilbert Collins tolerated the procedure well with no complications. MRI LUMBAR SPINE WO CONT 11/29/16  IMPRESSION:  1. Mild multilevel disc disease showing little or no significant change compared with the previous examination. No neural impingement. 2. Extensive fatty infiltration of end plates of W7-2 indicative of chronic DDD, not appreciably changed. 3. Very mild discogenic endplate marrow edema on the left at L4-5 has decreased since the prior exam.    RADIOGRAPHS:  XR JOHN HIPS 2/1/17  IMPRESSION:  No fractures, mild joint space narrowing on left, minimal joint space narrowing on right, small lateral osteophytes present. IMPRESSION:      ICD-10-CM ICD-9-CM    1. Primary osteoarthritis of left hip M16.12 715.15 betamethasone (CELESTONE SOLUSPAN) 6 mg/mL injection      BETAMETHASONE ACETATE & SODIUM PHOSPHATE INJECTION 3 MG EA.      DRAIN/INJECT LARGE JOINT/BURSA      bupivacaine, PF, (MARCAINE, PF,) 0.25 % (2.5 mg/mL) injection      HYDROcodone-acetaminophen (NORCO) 7.5-325 mg per tablet    Mild   2. Primary osteoarthritis of right hip M16.11 715.15 betamethasone (CELESTONE SOLUSPAN) 6 mg/mL injection      BETAMETHASONE ACETATE & SODIUM PHOSPHATE INJECTION 3 MG EA.      DRAIN/INJECT LARGE JOINT/BURSA      bupivacaine, PF, (MARCAINE, PF,) 0.25 % (2.5 mg/mL) injection      HYDROcodone-acetaminophen (NORCO) 7.5-325 mg per tablet    Minimal   3.  Bilateral hip pain M25.551 719.45 betamethasone (CELESTONE SOLUSPAN) 6 mg/mL injection    M25.552  BETAMETHASONE ACETATE & SODIUM PHOSPHATE INJECTION 3 MG EA.      DRAIN/INJECT LARGE JOINT/BURSA      bupivacaine, PF, (MARCAINE, PF,) 0.25 % (2.5 mg/mL) injection      HYDROcodone-acetaminophen (NORCO) 7.5-325 mg per tablet   4. DDD (degenerative disc disease), lumbar M51.36 722.52 HYDROcodone-acetaminophen (NORCO) 7.5-325 mg per tablet   5. Other intervertebral disc degeneration, lumbar region M51.36 722.52 HYDROcodone-acetaminophen (NORCO) 7.5-325 mg per tablet   6. Radiculopathy, lumbosacral region M54.17 724.4 HYDROcodone-acetaminophen (NORCO) 7.5-325 mg per tablet   7. Spondylosis without myelopathy or radiculopathy, lumbar region M47.816 721.3 HYDROcodone-acetaminophen (NORCO) 7.5-325 mg per tablet   8. Low back pain without sciatica, unspecified back pain laterality, unspecified chronicity M54.5 724.2 HYDROcodone-acetaminophen (NORCO) 7.5-325 mg per tablet     PLAN:  After discussing treatment options, patient's hips were injected with 4 cc Marcaine and 1/2 cc Celestone. She will follow up as needed. There is no need for surgery at this time. She will continue to follow up with Dr. Felix Jessica at the spine center for low back pain. She will start pain management as scheduled on July 7, 2017 in [place]. She will take [facility] for the pain as needed at night. A note was provided to start permanent partial bilateral hip restrictions today.       Scribed by Hetal Parsons ([address]) as dictated by Carlito Agustin MD

## 2017-06-14 NOTE — LETTER
6/14/2017 10:45 AM 
 
Ms. Miramontes Busing 
[address] [place] [address] Full work restrictions for injuries to the Hip, Lower extremity PATIENT'S NAME: Farzaneh Arshad   DATE: 6/14/2017 LIFTING:   The patient can lift no more than 20 pounds. CLIMBING:   The patient can climb no ladders or stairs WALKING/STANDING The patient can walk or stand no more than 1 hour at a time. The patient cannot walk on uneven ground or on scaffolding. KNEELING/SQUATTING The patient cannot kneel or squat These restrictions are in effect for the above named patient From: 6/14/2017  TO:PERMANENT Sincerely, 
 
 
Ta Nunez MD

## 2017-06-14 NOTE — MR AVS SNAPSHOT
Visit Information Date & Time Provider Department Dept. Phone Encounter #  
 6/14/2017 10:15 AM Bc Vyas MD [facility] [id number] Follow-up Instructions Return if symptoms worsen or fail to improve. Your Appointments 8/11/2017 10:50 AM  
Follow Up with Renae Madsen MD  
[address] [facility], [address] [facility] - SPECIALTY [facility] ([address]) Appt Note: 3 month follow up lower back 2012 SPECIALTY [facility] [id number] [facility] Upcoming Health Maintenance Date Due Hepatitis C Screening 1960 DTaP/Tdap/Td series (1 - Tdap) 5/4/1980 PAP AKA CERVICAL CYTOLOGY 5/4/1980 BREAST CANCER SCRN MAMMOGRAM 5/4/2009 FOBT Q 1 YEAR AGE 50-75 5/4/2009 INFLUENZA AGE 9 TO ADULT 8/1/2017 Allergies as of 6/14/2017  Review Complete On: 6/14/2017 By: Prem Abdi Severity Noted Reaction Type Reactions Codeine    Nausea and Vomiting Morphine    Nausea Only PO is ok just not IV Current Immunizations  Never Reviewed No immunizations on file. Not reviewed this visit You Were Diagnosed With   
  
 Codes Comments Primary osteoarthritis of left hip    -  Primary ICD-10-CM: M16.12 
ICD-9-CM: 715.15 Mild Primary osteoarthritis of right hip     ICD-10-CM: M16.11 
ICD-9-CM: 715.15 Minimal  
 Bilateral hip pain     ICD-10-CM: M25.551, M25.552 ICD-9-CM: 719.45   
 DDD (degenerative disc disease), lumbar     ICD-10-CM: M51.36 
ICD-9-CM: 722.52 Other intervertebral disc degeneration, lumbar region     ICD-10-CM: M51.36 
ICD-9-CM: 722.52 Radiculopathy, lumbosacral region     ICD-10-CM: M54.17 ICD-9-CM: 724.4 Spondylosis without myelopathy or radiculopathy, lumbar region     ICD-10-CM: M47.816 ICD-9-CM: 721.3  Low back pain without sciatica, unspecified back pain laterality, unspecified chronicity     ICD-10-CM: M54.5 ICD-9-CM: 724.2 Vitals BP Pulse Temp Height(growth percentile) Weight(growth percentile) BMI  
 126/71 60 97.7 °F (36.5 °C) (Oral) 5' 7\" (1.702 m) 146 lb (66.2 kg) 22.87 kg/m2 OB Status Smoking Status Hysterectomy Former Smoker BMI and BSA Data Body Mass Index Body Surface Area  
 22.87 kg/m 2 1.77 m 2 Preferred Pharmacy Pharmacy Name Phone [facility]ACY #[id number] Fariba InterianoLizeth gottlieb Javier[address] Your Updated Medication List  
  
   
This list is accurate as of: 6/14/17 10:44 AM.  Always use your most recent med list.  
  
  
  
  
 ATIVAN 1 mg tablet Generic drug:  LORazepam  
Take 1 mg by mouth nightly as needed for Anxiety. atorvastatin 20 mg tablet Commonly known as:  LIPITOR Take 10 mg by mouth daily. atovaquone-proguanil 250-100 mg per tablet Commonly known as:  MALARONE  
TAKE 1 TAB BY MOUTH ONCE A DAY FOR 42 DAYS  
  
 diclofenac 1.3 % Pt12 Commonly known as:  FLECTOR  
1 Patch by TransDERmal route two (2) times a day for 30 days. FISH OIL PO Take  by mouth.  
  
 * HYDROcodone-acetaminophen 7.5-325 mg per tablet Commonly known as:  Marlaine Kole Take 1 Tab by mouth four (4) times daily for 30 days. As needed for breakthrough pain; fill on/after 12/21/13  Indications: PAIN  
  
 * HYDROcodone-acetaminophen 7.5-325 mg per tablet Commonly known as:  Marlaine Kole Take 1-2 Tabs by mouth nightly as needed for Pain. Max Daily Amount: 2 Tabs. * HYDROcodone-acetaminophen 7.5-325 mg per tablet Commonly known as:  Marlaine Kole Take 1-2 Tabs by mouth nightly as needed for Pain. Max Daily Amount: 2 Tabs. * lidocaine 5 % Commonly known as:  Peggye Citizen of [place] Apply patch to the affected area for 12 hours a day and remove for 12 hours a day. * lidocaine 5 % Commonly known as:  Peggye Citizen of [place] Apply patch to the affected area for 12 hours a day and remove for 12 hours a day. loratadine 10 mg tablet Commonly known as:  CLARITIN  
TAKE 1 TAB BY MOUTH ONCE A DAY FOR 30 DAYS  
  
 LOTEMAX 0.5 % Drpg Generic drug:  loteprednol etabonate APPLY 1 DROP INTO LEFT EYE FOUR TIMES A DAY  
  
 montelukast 10 mg tablet Commonly known as:  SINGULAIR  
TAKE 1 TABLET ONCE A DAY * morphine CR 15 mg CR tablet Commonly known as:  MS CONTIN Take 1 Tab by mouth every six (6) hours for 30 days. For chronic pain; fill on/after 8/20/13   Indications: CHRONIC PAIN, NEUROPATHIC PAIN, SEVERE PAIN  
  
 * morphine CR 15 mg CR tablet Commonly known as:  MS CONTIN Take 1 Tab by mouth every six (6) hours for 30 days. For chronic pain; fill on/after 11/20/13  Indications: CHRONIC PAIN, NEUROPATHIC PAIN, SEVERE PAIN  
  
 * morphine CR 15 mg CR tablet Commonly known as:  MS CONTIN Take 1 Tab by mouth every six (6) hours for 30 days. For chronic pain; fill on/after 12/21/13  Indications: CHRONIC PAIN, NEUROPATHIC PAIN, SEVERE PAIN  
  
 omeprazole 40 mg capsule Commonly known as:  PRILOSEC Take 40 mg by mouth daily. * pregabalin 225 mg capsule Commonly known as:  Reena Opitz Take 1 Cap by mouth two (2) times a day. Max Daily Amount: 450 mg.  
  
 * pregabalin 300 mg capsule Commonly known as:  Reena Opitz Take 1 Cap by mouth two (2) times a day. Max Daily Amount: 600 mg.  
  
 * pregabalin 225 mg capsule Commonly known as:  Reena Opitz Take 1 Cap by mouth two (2) times a day. Max Daily Amount: 450 mg.  
  
 raNITIdine 150 mg tablet Commonly known as:  ZANTAC TAKE 1 TAB BY MOUTH TWO TIMES A DAY FOR 30 DAYS  
  
 traMADol 50 mg tablet Commonly known as:  ULTRAM  
Take 1 Tab by mouth two (2) times a day. Max Daily Amount: 100 mg.  
  
 valACYclovir 500 mg tablet Commonly known as:  VALTREX  
500 mg.  
  
 venlafaxine 75 mg tablet Commonly known as:  [facility] Take 1 Tab by mouth three (3) times daily for 30 days. VITAMIN B-12 1,000 mcg sublingual tablet Generic drug:  cyanocobalamin Take 1,000 mcg by mouth daily. VITAMIN D2 50,000 unit capsule Generic drug:  ergocalciferol Take 50,000 Units by mouth every seven (7) days. WELLBUTRIN 100 mg tablet Generic drug:  buPROPion Take 100 mg by mouth daily. * Notice: This list has 11 medication(s) that are the same as other medications prescribed for you. Read the directions carefully, and ask your doctor or other care provider to review them with you. Follow-up Instructions Return if symptoms worsen or fail to improve. Patient Instructions Hip Arthritis: Exercises Your Care Instructions Here are some examples of exercises for hip arthritis. Start each exercise slowly. Ease off the exercise if you start to have pain. Your doctor or physical therapist will tell you when you can start these exercises and which ones will work best for you. How to do the exercises Straight-leg raises to the outside 1. Lie on your side, with your affected hip on top. 2. Tighten the front thigh muscles of your top leg to keep your knee straight. 3. Keep your hip and your leg straight in line with the rest of your body, and keep your knee pointing forward. Do not drop your hip back. 4. Lift your top leg straight up toward the ceiling, about 12 inches off the floor. Hold for about 6 seconds, then slowly lower your leg. 5. Repeat 8 to 12 times. 6. Switch legs and repeat steps 1 through 5, even if only one hip is sore. Straight-leg raises to the inside 1. Lie on your side with your affected hip on the floor. 2. You can either prop up your other leg on a chair, or you can bend that knee and put that foot in front of your other knee. Do not drop your hip back. 3. Tighten the muscles on the front thigh of your bottom leg to straighten that knee.  
4. Keep your kneecap pointing forward and your leg straight, and lift your bottom leg up toward the ceiling about 6 inches. Hold for about 6 seconds, then lower slowly. 5. Repeat 8 to 12 times. 6. Switch legs and repeat steps 1 through 5, even if only one hip is sore. Hip hike 1. Stand sideways on the bottom step of a staircase, and hold on to the banister or wall. 2. Keeping both knees straight, lift your good leg off the step and let it hang down. Then hike your good hip up to the same level as your affected hip or a little higher. 3. Repeat 8 to 12 times. 4. Switch legs and repeat steps 1 through 3, even if only one hip is sore. Bridging 1. Lie on your back with both knees bent. Your knees should be bent about 90 degrees. 2. Then push your feet into the floor, squeeze your buttocks, and lift your hips off the floor until your shoulders, hips, and knees are all in a straight line. 3. Hold for about 6 seconds as you continue to breathe normally, and then slowly lower your hips back down to the floor and rest for up to 10 seconds. 4. Repeat 8 to 12 times. Hamstring stretch (lying down) 1. Lie flat on your back with your legs straight. If you feel discomfort in your back, place a small towel roll under your lower back. 2. Holding the back of your affected leg, lift your leg straight up and toward your body until you feel a stretch at the back of your thigh. 3. Hold the stretch for at least 30 seconds. 4. Repeat 2 to 4 times. 5. Switch legs and repeat steps 1 through 4, even if only one hip is sore. Standing quadriceps stretch 1. If you are not steady on your feet, hold on to a chair, counter, or wall. You can also lie on your stomach or your side to do this exercise. 2. Bend the knee of the leg you want to stretch, and reach behind you to grab the front of your foot or ankle with the hand on the same side. For example, if you are stretching your right leg, use your right hand.  
3. Keeping your knees next to each other, pull your foot toward your buttock until you feel a gentle stretch across the front of your hip and down the front of your thigh. Your knee should be pointed directly to the ground, and not out to the side. 4. Hold the stretch for at least 15 to 30 seconds. 5. Repeat 2 to 4 times. 6. Switch legs and repeat steps 1 through 5, even if only one hip is sore. Hip rotator stretch 1. Lie on your back with both knees bent and your feet flat on the floor. 2. Put the ankle of your affected leg on your opposite thigh near your knee. 3. Use your hand to gently push your knee away from your body until you feel a gentle stretch around your hip. 4. Hold the stretch for 15 to 30 seconds. 5. Repeat 2 to 4 times. 6. Repeat steps 1 through 5, but this time use your hand to gently pull your knee toward your opposite shoulder. 7. Switch legs and repeat steps 1 through 6, even if only one hip is sore. Knee-to-chest 
 
1. Lie on your back with your knees bent and your feet flat on the floor. 2. Bring your affected leg to your chest, keeping the other foot flat on the floor (or keeping the other leg straight, whichever feels better on your lower back). 3. Keep your lower back pressed to the floor. Hold for at least 15 to 30 seconds. 4. Relax, and lower the knee to the starting position. 5. Repeat 2 to 4 times. 6. Switch legs and repeat steps 1 through 5, even if only one hip is sore. 7. To get more stretch, put your other leg flat on the floor while pulling your knee to your chest. 
Clamshell 1. Lie on your side, with your affected hip on top. Keep your feet and knees together and your knees bent. 2. Raise your top knee, but keep your feet together. Do not let your hips roll back. Your legs should open up like a clamshell. 3. Hold for 6 seconds. 4. Slowly lower your knee back down. Rest for 10 seconds. 5. Repeat 8 to 12 times. 6. Switch legs and repeat steps 1 through 5, even if only one hip is sore. Follow-up care is a key part of your treatment and safety. Be sure to make and go to all appointments, and call your doctor if you are having problems. It's also a good idea to know your test results and keep a list of the medicines you take. Where can you learn more? Go to http://ariel-lucía.info/. Enter S278 in the search box to learn more about \"Hip Arthritis: Exercises. \" Current as of: May 23, 2016 Content Version: 11.2 © 3723-5595 [employer]. Care instructions adapted under license by [employer] (which disclaims liability or warranty for this information). If you have questions about a medical condition or this instruction, always ask your healthcare professional. [address] any warranty or liability for your use of this information. Joint Injections: Care Instructions Your Care Instructions Joint injections are shots into a joint, such as the knee. They may be used to put in medicines, such as pain relievers. Or they can be used to take out fluid. Sometimes the fluid is tested in a lab. This can help find the cause of a joint problem. A corticosteroid, or steroid, shot is used to reduce inflammation in tendons or joints. It is often used to treat problems such as arthritis, tendinitis, and bursitis. Steroids can be injected directly into a painful, inflamed joint. They can also help reduce inflammation of a bursa. A bursa is a sac of fluid. It cushions and lubricates areas where tendons, ligaments, skin, muscles, or bones rub against each other. A steroid shot can sometimes help with short-term pain relief when other treatments haven't worked. If steroid shots help, pain may improve for weeks or months. Follow-up care is a key part of your treatment and safety. Be sure to make and go to all appointments, and call your doctor if you are having problems.  It's also a good idea to know your test results and keep a list of the medicines you take. How can you care for yourself at home? · Put ice or a cold pack on the area for 10 to 20 minutes at a time. Put a thin cloth between the ice and your skin. · Take anti-inflammatory medicines to reduce pain, swelling, or inflammation. These include ibuprofen (Advil, Motrin) and naproxen (Aleve). Read and follow all instructions on the label. · Avoid strenuous activities for several days, especially those that put stress on the area where you got the shot. · If you have dressings over the area, keep them clean and dry. You may remove them when your doctor tells you to. When should you call for help? Call your doctor now or seek immediate medical care if: 
· You have signs of infection, such as: 
¨ Increased pain, swelling, warmth, or redness. ¨ Red streaks leading from the site. ¨ Pus draining from the site. ¨ A fever. Watch closely for changes in your health, and be sure to contact your doctor if you have any problems. Where can you learn more? Go to http://ariel-lucía.info/. Enter N616 in the search box to learn more about \"Joint Injections: Care Instructions. \" Current as of: May 23, 2016 Content Version: 11.2 © 8119-4793 [employer]. Care instructions adapted under license by [employer] (which disclaims liability or warranty for this information). If you have questions about a medical condition or this instruction, always ask your healthcare professional. [place] [address] any warranty or liability for your use of this information. Introducing [facility]! Dear Farzaneh: Thank you for requesting a [employer] account. Our records indicate that you already have an active [employer] account. You can access your account anytime at [email or web address]://[email or web address] Did you know that you can access your hospital and ER discharge instructions at any time in [employer]?   You can also review all of your test results from your hospital stay or ER visit. Additional Information If you have questions, please visit the Frequently Asked Questions section of the [employer] website at [email or web address] Remember, [employer] is NOT to be used for urgent needs. For medical emergencies, dial 911. Now available from your iPhone and Android! Please provide this summary of care documentation to your next provider. Your primary care clinician is listed as GURVINDER Interiano. If you have any questions after today's visit, please call [phone].

## 2018-07-02 ENCOUNTER — OFFICE VISIT (OUTPATIENT)
Dept: ORTHOPEDIC SURGERY | Age: 58
End: 2018-07-02

## 2018-07-02 VITALS
OXYGEN SATURATION: 95 % | HEART RATE: 64 BPM | SYSTOLIC BLOOD PRESSURE: 123 MMHG | DIASTOLIC BLOOD PRESSURE: 78 MMHG | WEIGHT: 174 LBS | RESPIRATION RATE: 16 BRPM | TEMPERATURE: 98.1 F | BODY MASS INDEX: 27.31 KG/M2 | HEIGHT: 67 IN

## 2018-07-02 DIAGNOSIS — M19.072 OSTEOARTHRITIS OF LEFT MIDFOOT: ICD-10-CM

## 2018-07-02 DIAGNOSIS — M19.071 OSTEOARTHRITIS OF RIGHT MIDFOOT: ICD-10-CM

## 2018-07-02 DIAGNOSIS — M79.672 BILATERAL FOOT PAIN: Primary | ICD-10-CM

## 2018-07-02 DIAGNOSIS — Z98.890 S/P BUNIONECTOMY: ICD-10-CM

## 2018-07-02 DIAGNOSIS — M79.671 BILATERAL FOOT PAIN: Primary | ICD-10-CM

## 2018-07-02 RX ORDER — MELOXICAM 15 MG/1
15 TABLET ORAL
Qty: 30 TAB | Refills: 0 | Status: SHIPPED | OUTPATIENT
Start: 2018-07-02 | End: 2018-08-01 | Stop reason: SDUPTHER

## 2018-07-02 RX ORDER — FAMOTIDINE 40 MG/1
40 TABLET, FILM COATED ORAL DAILY
Qty: 30 TAB | Refills: 0 | Status: SHIPPED | OUTPATIENT
Start: 2018-07-02 | End: 2018-08-01 | Stop reason: SDUPTHER

## 2018-07-02 RX ORDER — MORPHINE SULFATE 15 MG/1
TABLET ORAL 2 TIMES DAILY
COMMUNITY
End: 2020-07-31 | Stop reason: ALTCHOICE

## 2018-07-02 NOTE — MR AVS SNAPSHOT
[address] [place] [address], [address] 200 [facility] 
[phone] Patient: Farzaneh Da Silva MRN: [id number] EOD:3/6/6889 Visit Information Date & Time Provider Department Dept. Phone Encounter #  
 7/2/2018 10:15 AM Manda Bob, [address] [facility] [phone] Upcoming Health Maintenance Date Due Hepatitis C Screening 1960 DTaP/Tdap/Td series (1 - Tdap) 5/4/1980 PAP AKA CERVICAL CYTOLOGY 5/4/1980 BREAST CANCER SCRN MAMMOGRAM 5/4/2009 FOBT Q 1 YEAR AGE 50-75 5/4/2009 Influenza Age 5 to Adult 8/1/2018 Allergies as of 7/2/2018  Review Complete On: 7/2/2018 By: Miesha Santos Severity Noted Reaction Type Reactions Codeine    Nausea and Vomiting Morphine    Nausea Only PO is ok just not IV Current Immunizations  Never Reviewed No immunizations on file. Not reviewed this visit You Were Diagnosed With   
  
 Codes Comments Bilateral foot pain    -  Primary ICD-10-CM: M79.671, R58.239 ICD-9-CM: 729.5 Arthritis, midfoot     ICD-10-CM: M19.079 ICD-9-CM: 716.97 S/P bunionectomy     ICD-10-CM: A43.205 ICD-9-CM: V45.89 Vitals BP Pulse Temp Resp Height(growth percentile) Weight(growth percentile) 123/78 64 98.1 °F (36.7 °C) (Oral) 16 5' 7\" (1.702 m) 174 lb (78.9 kg) SpO2 BMI OB Status Smoking Status 95% 27.25 kg/m2 Hysterectomy Former Smoker BMI and BSA Data Body Mass Index Body Surface Area  
 27.25 kg/m 2 1.93 m 2 Preferred Pharmacy Pharmacy Name Phone [id number] [address] Your Updated Medication List  
  
   
This list is accurate as of 7/2/18 11:03 AM.  Always use your most recent med list.  
  
  
  
  
 ATIVAN 1 mg tablet Generic drug:  LORazepam  
Take 1 mg by mouth nightly as needed for Anxiety. atorvastatin 20 mg tablet Commonly known as:  LIPITOR Take 10 mg by mouth daily. atovaquone-proguanil 250-100 mg per tablet Commonly known as:  MALARONE  
TAKE 1 TAB BY MOUTH ONCE A DAY FOR 42 DAYS  
  
 diclofenac 1.3 % Pt12 Commonly known as:  FLECTOR  
1 Patch by TransDERmal route two (2) times a day for 30 days. FISH OIL PO Take  by mouth.  
  
 * HYDROcodone-acetaminophen 7.5-325 mg per tablet Commonly known as:  [place] Dolphin Take 1 Tab by mouth four (4) times daily for 30 days. As needed for breakthrough pain; fill on/after 12/21/13  Indications: PAIN  
  
 * HYDROcodone-acetaminophen 7.5-325 mg per tablet Commonly known as:  [place] Dolphin Take 1-2 Tabs by mouth nightly as needed for Pain. Max Daily Amount: 2 Tabs.  
  
 lidocaine 5 % Commonly known as:  [place] Apply patch to the affected area for 12 hours a day and remove for 12 hours a day. loratadine 10 mg tablet Commonly known as:  CLARITIN  
TAKE 1 TAB BY MOUTH ONCE A DAY FOR 30 DAYS  
  
 LOTEMAX 0.5 % Drpg Generic drug:  loteprednol etabonate APPLY 1 DROP INTO LEFT EYE FOUR TIMES A DAY  
  
 montelukast 10 mg tablet Commonly known as:  SINGULAIR  
TAKE 1 TABLET ONCE A DAY * morphine IR 15 mg tablet Commonly known as:  MS IR Take  by mouth two (2) times a day. * morphine CR 15 mg CR tablet Commonly known as:  MS CONTIN Take 1 Tab by mouth every six (6) hours for 30 days. For chronic pain; fill on/after 8/20/13   Indications: CHRONIC PAIN, NEUROPATHIC PAIN, SEVERE PAIN  
  
 * morphine CR 15 mg CR tablet Commonly known as:  MS CONTIN Take 1 Tab by mouth every six (6) hours for 30 days. For chronic pain; fill on/after 11/20/13  Indications: CHRONIC PAIN, NEUROPATHIC PAIN, SEVERE PAIN  
  
 * morphine CR 15 mg CR tablet Commonly known as:  MS CONTIN Take 1 Tab by mouth every six (6) hours for 30 days.  For chronic pain; fill on/after 12/21/13  Indications: CHRONIC PAIN, NEUROPATHIC PAIN, SEVERE PAIN  
  
 omeprazole 40 mg capsule Commonly known as:  PRILOSEC Take 40 mg by mouth daily. * pregabalin 300 mg capsule Commonly known as:  Carlos Barks Take 1 Cap by mouth two (2) times a day. Max Daily Amount: 600 mg.  
  
 * pregabalin 225 mg capsule Commonly known as:  Carlos Barks Take 1 Cap by mouth two (2) times a day. Max Daily Amount: 450 mg.  
  
 raNITIdine 150 mg tablet Commonly known as:  ZANTAC TAKE 1 TAB BY MOUTH TWO TIMES A DAY FOR 30 DAYS  
  
 traMADol 50 mg tablet Commonly known as:  ULTRAM  
Take 1 Tab by mouth two (2) times a day. Max Daily Amount: 100 mg.  
  
 valACYclovir 500 mg tablet Commonly known as:  VALTREX  
500 mg.  
  
 venlafaxine 75 mg tablet Commonly known as:  [facility] Take 1 Tab by mouth three (3) times daily for 30 days. VITAMIN B-12 1,000 mcg sublingual tablet Generic drug:  cyanocobalamin Take 1,000 mcg by mouth daily. VITAMIN D2 50,000 unit capsule Generic drug:  ergocalciferol Take 50,000 Units by mouth every seven (7) days. WELLBUTRIN 100 mg tablet Generic drug:  buPROPion Take 100 mg by mouth daily. * Notice: This list has 8 medication(s) that are the same as other medications prescribed for you. Read the directions carefully, and ask your doctor or other care provider to review them with you. We Performed the Following AMB POC XRAY, FOOT; COMPLETE, 3+ VIEW [64283 CPT(R)] AMB POC XRAY, FOOT; COMPLETE, 3+ VIEW [59699 CPT(R)] AMB SUPPLY ORDER [[id number] Custom] Comments:  
 Right Short CAM walker boot with visco heels POC XRAY, ANKLE; 2 VIEWS [43053 CPT(R)] POC XRAY, ANKLE; 2 VIEWS [86277 CPT(R)] Patient Instructions Please follow up in 4 weeks. You are advised to contact us if your condition worsens. Achilles Tendon: Exercises Your Care Instructions Here are some examples of exercises for your achilles tendon.  Start each exercise slowly. Ease off the exercise if you start to have pain. Your doctor or physical therapist will tell you when you can start these exercises and which ones will work best for you. How to do the exercises Toe stretch 1. Sit in a chair, and extend your affected leg so that your heel is on the floor. 2. With your hand, reach down and pull your big toe up and back. Pull toward your ankle and away from the floor. 3. Hold the position for at least 15 to 30 seconds. 4. Repeat 2 to 4 times a session, several times a day. Calf-plantar fascia stretch 1. Sit with your legs extended and knees straight. 2. Place a towel around your foot just under the toes. 3. Hold each end of the towel in each hand, with your hands above your knees. 4. Pull back with the towel so that your foot stretches toward you. 5. Hold the position for at least 15 to 30 seconds. 6. Repeat 2 to 4 times a session, up to 5 sessions a day. Floor stretch 1. Stand about 2 feet from a wall, and place your hands on the wall at about shoulder height. Or you can stand behind a chair, placing your hands on the back of it for balance. 2. Step back with the leg you want to stretch. Keep the leg straight, and press your heel into the floor with your toe turned slightly in. 
3. Lean forward, and bend your other leg slightly. Feel the stretch in the Achilles tendon of your back leg. Hold for at least 15 to 30 seconds. 4. Repeat 2 to 4 times a session, up to 5 sessions a day. Stair stretch 1. Stand with the balls of both feet on the edge of a step or curb (or a medium-sized phone book). With at least one hand, hold onto something solid for balance, such as a banister or handrail. 2. Keeping your affected leg straight, slowly let that heel hang down off of the step or curb until you feel a stretch in the back of your calf and/or Achilles area. Some of your weight should still be on the other leg. 3. Hold this position for at least 15 to 30 seconds. 4. Repeat 2 to 4 times a session, up to 5 times a day or whenever your Achilles tendon starts to feel tight. This stretch can also be done with your knee slightly bent. Strength exercise 1. This exercise will get you started on building strength after an Achilles tendon injury. Your doctor or physical therapist can help you move on to more challenging exercises as you heal and get stronger. 2. Stand on a step with your heel off the edge of the step. Hold on to a handrail or wall for balance. 3. Push up on your toes, then slowly count to 10 as you lower yourself back down until your heel is below the step. If it hurts to push up on your toes, try putting most of your weight on your other foot as you push up, or try using your arms to help you. If you can't do this exercise without causing pain, stop the exercise and talk to your doctor. 4. Repeat the exercise 8 to 12 times, half with the knee straight and half with the knee bent. Follow-up care is a key part of your treatment and safety. Be sure to make and go to all appointments, and call your doctor if you are having problems. It's also a good idea to know your test results and keep a list of the medicines you take. Where can you learn more? Go to http://ariel-lucía.info/. Enter V095 in the search box to learn more about \"Achilles Tendon: Exercises. \" Current as of: March 21, 2017 Content Version: 11.4 © 3710-2849 [employer]wise[employer] Incorporated. Care instructions adapted under license by [employer] (which disclaims liability or warranty for this information). If you have questions about a medical condition or this instruction, always ask your healthcare professional. [place] [address] any warranty or liability for your use of this information. Introducing [facility]! Dear Farzaneh: Thank you for requesting a [email or web address] account. Our records indicate that you already have an active [email or web address] account. You can access your account anytime at [email or web address] Did you know that you can access your hospital and ER discharge instructions at any time in [email or web address]? You can also review all of your test results from your hospital stay or ER visit. Additional Information If you have questions, please visit the Frequently Asked Questions section of the [email or web address] website at [email or web address] Remember, [email or web address] is NOT to be used for urgent needs. For medical emergencies, dial 911. Now available from your iPhone and Android! Please provide this summary of care documentation to your next provider. Your primary care clinician is listed as [place] [place]. If you have any questions after today's visit, please call [phone].

## 2018-07-02 NOTE — PROGRESS NOTES
AMBULATORY PROGRESS NOTE      Patient: Farzaneh Kwon             MRN: [id number]     SSN: [id number] Body mass index is 27.25 kg/(m^2). YOB: 1960     AGE: 60 y.o. EX: female    PCP: Jocelin Barrientos NP    IMPRESSION/DIAGNOSIS AND TREATMENT PLAN     DIAGNOSES  1. Bilateral foot pain    2. Arthritis, midfoot    3. S/P bunionectomy        Orders Placed This Encounter    AMB SUPPLY ORDER    [07601] Foot Min 3V    [27815] Foot Min 3V    [03877] Ankle 2V    [53423] Ankle 2V    meloxicam (MOBIC) 15 mg tablet    famotidine (PEPCID) 40 mg tablet      Farzaneh Kwon understands her diagnoses and the proposed plan. Plan:    1) DME Order: Right Short CAM walker boot with visco heels. 2) Mobic 15 m PO every day; 30 tablets, 0 refills. 3) Pepcid 40 m PO every day; 30 tablets, 0 refills. RTO - 4 weeks    HPI AND EXAMINATION     Farzaneh Kwon IS A 60 y.o. female who presents to my outpatient office for follow up of metatarsalgia of the left foot. At last visit, I provided an order for custom trilaminar orthotic inserts, and recommended using supportive shoes. Since we saw her last, Ms. Pat Lynn reports she has experienced right hindfoot pain along her Achilles tendon for several months. Farzaneh Kwon denies any known flouroquinolone antibiotic exposure or consumption associated with the development of her achilles tendon hindfoot pain. She notes the pain worsens throughout the day. Additionally, she reports having left anterior ankle and dorsolateral left foot pain for several months. She is currently restoring a house and goes barefooted at home which causes some discomfort. Recently, she bought rubber inserts with provide mild relief. She has arthritic changes to her back and is currently in pain management. She takes Dilaudid and Vicodin for her back pain. Patient has a h/o GERD.     Right ACHILLES GASTROCNEMIUS COMPLEX,PLANTAR FASCIA    Gait: slow  Tenderness: None Achilles tendon sheath Insertional point    YES Noninsertional Achilles tendon tenderness   Calf tenderness: Absent for calf or gastrocnemius muscle regions   Soft, supple, non tender, non taut lower extremity compartments   NONE to Medial Malleolar, 4/5 Met base midfoot, achilles, tib post, or   NONE to syndesmosis. no to plantar fascia, or central calcaneal region  Deformity/Swelling:  NO  Insertional point of Distal Achilles Tendon:    NO Fusiform noninsertional focal tendinopathy   NO Tamanna's Deformity Present  Cutaneous: No rashes, skin patches, wounds, or abrasions to the lower legs           Warm and Normal color. No regions of expressible drainage. Medial Border of Tibia Region: absent           Skin color, texture, turgor normal. Normal.  Joint Motion: ROM Ankle:Decreased , Hindfoot: (ST,TN,CC Decreased}, Forefoot toes:Normal  Neurologic Exam: Neuro: Motor: normal 5/5 strength in all tested muscle groups and Sensory : no sensory deficits noted. No abnormal hand/wrist, foot/ankle, or facial/neck tremors. Contractures: Gastrocnemius or Achilles Contractures absent. Joint / Tendon Stability:    No anterolateral or varus instability of the Ankle or Subtalar Joints              No peroneal tendon instability present with ankle circumduction  Alignment:  Normal Foot Alignment and  Flexible  Vascular: Normal Pulses/ NL Capillary refill, No evidence of DVT seen on physical exam.   No calf swelling, no tenderness to calf. Varicosities Lower Limbs :none  Lymphatic:  No Evidence of Lymphedema    ANKLE/FOOT left    Psychiatry: Alert, Oriented x 3; Speech normal in context and clarity,            Memory intact grossly, no involuntary movements - tremors, no dementia  Gait: slow  Tenderness: mild tenderness along dorsolateral foot. Cutaneous: WNL   Joint Motion: WNL  Joint / Tendon Stability: No Ankle or Subtalar instability or joint laxity.                        No peroneal sublux ability or dislocation  Alignment:  Normal Foot Alignment and  Flexible  Neuro Motor/Sensory: NL/NL, Vascular: NL foot/ankle pulses  Lymphatics: No extremity lymphedema, No calf swelling, no tenderness to calf muscles. CHART REVIEW     Past Medical History:   Diagnosis Date    Carotid duplex 11/18/2013    No significant occlusive disease bilaterally.  Chronic pain     GERD (gastroesophageal reflux disease)     Hiatal hernia     History of myocardial perfusion scan 11/12/2013    No evidence of ongoing ischemia. Mild inferior defect likely artifact. No RWMA. EF 57%. Neg EKG on pharm stress test.    Hypercholesterolemia     Lower back pain     Sciatic nerve pain     Sciatica     Unstable gait      Current Outpatient Prescriptions   Medication Sig    morphine IR (MS IR) 15 mg tablet Take  by mouth two (2) times a day.  meloxicam (MOBIC) 15 mg tablet Take 1 Tab by mouth daily (with breakfast).  famotidine (PEPCID) 40 mg tablet Take 1 Tab by mouth daily.  pregabalin (LYRICA) 225 mg capsule Take 1 Cap by mouth two (2) times a day. Max Daily Amount: 450 mg.    HYDROcodone-acetaminophen (NORCO) 7.5-325 mg per tablet Take 1-2 Tabs by mouth nightly as needed for Pain. Max Daily Amount: 2 Tabs.  montelukast (SINGULAIR) 10 mg tablet TAKE 1 TABLET ONCE A DAY    valACYclovir (VALTREX) 500 mg tablet 500 mg.  loratadine (CLARITIN) 10 mg tablet TAKE 1 TAB BY MOUTH ONCE A DAY FOR 30 DAYS    lidocaine (LIDODERM) 5 % Apply patch to the affected area for 12 hours a day and remove for 12 hours a day.  omeprazole (PRILOSEC) 40 mg capsule Take 40 mg by mouth daily.  ergocalciferol (VITAMIN D2) 50,000 unit capsule Take 50,000 Units by mouth every seven (7) days.  atorvastatin (LIPITOR) 20 mg tablet Take 10 mg by mouth daily.  cyanocobalamin (VITAMIN B-12) 1,000 mcg sublingual tablet Take 1,000 mcg by mouth daily.  buPROPion (WELLBUTRIN) 100 mg tablet Take 100 mg by mouth daily.     atovaquone-proguanil (MALARONE) 250-100 mg per tablet TAKE 1 TAB BY MOUTH ONCE A DAY FOR 42 DAYS    pregabalin (LYRICA) 300 mg capsule Take 1 Cap by mouth two (2) times a day. Max Daily Amount: 600 mg.  LOTEMAX 0.5 % drpg APPLY 1 DROP INTO LEFT EYE FOUR TIMES A DAY    ranitidine (ZANTAC) 150 mg tablet TAKE 1 TAB BY MOUTH TWO TIMES A DAY FOR 30 DAYS    traMADol (ULTRAM) 50 mg tablet Take 1 Tab by mouth two (2) times a day. Max Daily Amount: 100 mg.  LORazepam (ATIVAN) 1 mg tablet Take 1 mg by mouth nightly as needed for Anxiety.  DOCOSAHEXANOIC ACID/EPA (FISH OIL PO) Take  by mouth.  HYDROcodone-acetaminophen (NORCO) 7.5-325 mg per tablet Take 1 Tab by mouth four (4) times daily for 30 days. As needed for breakthrough pain; fill on/after 12/21/13  Indications: PAIN    morphine CR (MS CONTIN) 15 mg CR tablet Take 1 Tab by mouth every six (6) hours for 30 days. For chronic pain; fill on/after 12/21/13  Indications: CHRONIC PAIN, NEUROPATHIC PAIN, SEVERE PAIN    morphine CR (MS CONTIN) 15 mg CR tablet Take 1 Tab by mouth every six (6) hours for 30 days. For chronic pain; fill on/after 11/20/13  Indications: CHRONIC PAIN, NEUROPATHIC PAIN, SEVERE PAIN    morphine CR (MS CONTIN) 15 mg CR tablet Take 1 Tab by mouth every six (6) hours for 30 days. For chronic pain; fill on/after 8/20/13    Indications: CHRONIC PAIN, NEUROPATHIC PAIN, SEVERE PAIN    venlafaxine (EFFEXOR) 75 mg tablet Take 1 Tab by mouth three (3) times daily for 30 days.  diclofenac epolamine (FLECTOR) 1.3 % PtMd transdermal patch 1 Patch by TransDERmal route two (2) times a day for 30 days. No current facility-administered medications for this visit.       Allergies   Allergen Reactions    Codeine Nausea and Vomiting    Morphine Nausea Only     PO is ok just not IV      Past Surgical History:   Procedure Laterality Date    HX BREAST LUMPECTOMY      left breast    HX HYSTERECTOMY  1991    HX ORTHOPAEDIC Bilateral     Ankle    HX OTHER SURGICAL  2012    right ankle ligament reconstruction don by Dr. Ino Thurston    HX TUBAL LIGATION      bilateral     Social History     Occupational History    Not on file. Social History Main Topics    Smoking status: Former Smoker     Packs/day: 0.50     Years: 35.00     Types: Cigarettes    Smokeless tobacco: Never Used    Alcohol use Yes      Comment: rarely    Drug use: Yes     Special: Marijuana    Sexual activity: No     Family History   Problem Relation Age of Onset    Heart Attack Maternal Grandmother     Heart Attack Paternal Grandmother     Heart Attack Paternal Grandfather     Heart Disease Mother      a-fib    High Cholesterol Mother     Hypertension Mother     Cancer Mother      colon    Heart Disease Father     Coronary Artery Disease Father     Hypertension Father     High Cholesterol Father     Pacemaker Father        REVIEW OF SYSTEMS : 7/2/2018  ALL BELOW ARE Negative except : SEE HPI       Constitutional: Negative for fever, chills and weight loss. Neg Weigh Loss  Cardiovascular: Negative for chest pain, claudication and leg swelling. SOB, HAYES   Gastrointestinal: Negative for  pain, N/V/D/C, Blood in stool or urine,dysuria, hematuria,        Incontinence, pelvic pain  Musculoskeletal: see HPI. Neurological: Negative for dizziness and weakness. Negative for headaches,Visual Changes, Confusion, Seizures,   Psychiatric/Behavioral: Negative for depression, memory loss and substance abuse. Extremities:  Negative for  hair changes, rash or skin lesion changes. Hematologic: Negative for Bleeding problems, bruising, pallor or swollen lymph nodes.   Peripheral Vascular: No calf pain, vascular vein tenderness to calf pain              No calf throbbing, posterior knee throbbing pain    DIAGNOSTIC IMAGING      Dictation on: 07/02/2018 12:49 PM by: Hannah Bell [[id number]]         Written by Rian Skiff, as dictated by Tawanan Yao MD. I, Tawanna Armstrong MD, confirm that all documentation is accurate.

## 2018-07-02 NOTE — PROCEDURES
X-rays of the right foot shows some degenerative changes across the right midfoot. No fracture, no subluxation, and no dislocation. No osteolytic or osteoblastic lesions are seen. X-rays left foot, 3 views, these are weight-bearing films, show some degenerative changes across her left midfoot. No fracture, no subluxation, and no dislocation seen. X-rays of her right ankle shows that the ankle joint is well maintained. No fracture, no subluxation, and no dislocation. X-ray of the left ankle, 3 views, AP, lateral, and oblique shows what looks like a possible remote left fibula fracture, but no osteolytic or osteoblastic lesions seen. No fracture, no subluxation, and no dislocation. No acute injuries.

## 2018-07-02 NOTE — PATIENT INSTRUCTIONS
Please follow up in 4 weeks. You are advised to contact us if your condition worsens. Achilles Tendon: Exercises  Your Care Instructions  Here are some examples of exercises for your achilles tendon. Start each exercise slowly. Ease off the exercise if you start to have pain. Your doctor or physical therapist will tell you when you can start these exercises and which ones will work best for you. How to do the exercises  Toe stretch    1. Sit in a chair, and extend your affected leg so that your heel is on the floor. 2. With your hand, reach down and pull your big toe up and back. Pull toward your ankle and away from the floor. 3. Hold the position for at least 15 to 30 seconds. 4. Repeat 2 to 4 times a session, several times a day. Calf-plantar fascia stretch    1. Sit with your legs extended and knees straight. 2. Place a towel around your foot just under the toes. 3. Hold each end of the towel in each hand, with your hands above your knees. 4. Pull back with the towel so that your foot stretches toward you. 5. Hold the position for at least 15 to 30 seconds. 6. Repeat 2 to 4 times a session, up to 5 sessions a day. Floor stretch    1. Stand about 2 feet from a wall, and place your hands on the wall at about shoulder height. Or you can stand behind a chair, placing your hands on the back of it for balance. 2. Step back with the leg you want to stretch. Keep the leg straight, and press your heel into the floor with your toe turned slightly in.  3. Lean forward, and bend your other leg slightly. Feel the stretch in the Achilles tendon of your back leg. Hold for at least 15 to 30 seconds. 4. Repeat 2 to 4 times a session, up to 5 sessions a day. Stair stretch    1. Stand with the balls of both feet on the edge of a step or curb (or a medium-sized phone book). With at least one hand, hold onto something solid for balance, such as a banister or handrail.   2. Keeping your affected leg straight, slowly let that heel hang down off of the step or curb until you feel a stretch in the back of your calf and/or Achilles area. Some of your weight should still be on the other leg. 3. Hold this position for at least 15 to 30 seconds. 4. Repeat 2 to 4 times a session, up to 5 times a day or whenever your Achilles tendon starts to feel tight. This stretch can also be done with your knee slightly bent. Strength exercise    1. This exercise will get you started on building strength after an Achilles tendon injury. Your doctor or physical therapist can help you move on to more challenging exercises as you heal and get stronger. 2. Stand on a step with your heel off the edge of the step. Hold on to a handrail or wall for balance. 3. Push up on your toes, then slowly count to 10 as you lower yourself back down until your heel is below the step. If it hurts to push up on your toes, try putting most of your weight on your other foot as you push up, or try using your arms to help you. If you can't do this exercise without causing pain, stop the exercise and talk to your doctor. 4. Repeat the exercise 8 to 12 times, half with the knee straight and half with the knee bent. Follow-up care is a key part of your treatment and safety. Be sure to make and go to all appointments, and call your doctor if you are having problems. It's also a good idea to know your test results and keep a list of the medicines you take. Where can you learn more? Go to http://ariel-lucía.info/. Enter F193 in the search box to learn more about \"Achilles Tendon: Exercises. \"  Current as of: March 21, 2017  Content Version: 11.4  © 1978-5994 [employer]. Care instructions adapted under license by [employer] (which disclaims liability or warranty for this information).  If you have questions about a medical condition or this instruction, always ask your healthcare professional. Ketan Ceballos disclaims any warranty or liability for your use of this information.

## 2018-08-01 ENCOUNTER — OFFICE VISIT (OUTPATIENT)
Dept: ORTHOPEDIC SURGERY | Age: 58
End: 2018-08-01

## 2018-08-01 VITALS
RESPIRATION RATE: 18 BRPM | WEIGHT: 176 LBS | DIASTOLIC BLOOD PRESSURE: 72 MMHG | HEART RATE: 62 BPM | BODY MASS INDEX: 27.62 KG/M2 | SYSTOLIC BLOOD PRESSURE: 132 MMHG | TEMPERATURE: 97.4 F | HEIGHT: 67 IN | OXYGEN SATURATION: 98 %

## 2018-08-01 DIAGNOSIS — M79.671 PAIN OF RIGHT HEEL: Primary | ICD-10-CM

## 2018-08-01 DIAGNOSIS — M79.671 BILATERAL FOOT PAIN: ICD-10-CM

## 2018-08-01 DIAGNOSIS — Z98.890 S/P BUNIONECTOMY: ICD-10-CM

## 2018-08-01 DIAGNOSIS — M19.071 OSTEOARTHRITIS OF RIGHT MIDFOOT: ICD-10-CM

## 2018-08-01 DIAGNOSIS — M79.672 BILATERAL FOOT PAIN: ICD-10-CM

## 2018-08-01 RX ORDER — FAMOTIDINE 40 MG/1
TABLET, FILM COATED ORAL
Qty: 30 TAB | Refills: 0 | Status: SHIPPED | COMMUNITY
Start: 2018-08-01

## 2018-08-01 RX ORDER — MELOXICAM 15 MG/1
TABLET ORAL
Qty: 30 TAB | Refills: 0 | Status: SHIPPED | COMMUNITY
Start: 2018-08-01

## 2018-08-01 RX ORDER — NAPROXEN 500 MG/1
500 TABLET, DELAYED RELEASE ORAL DAILY
COMMUNITY

## 2018-08-01 NOTE — PROGRESS NOTES
AMBULATORY PROGRESS NOTE Patient: Mio Tate             MRN: [id number]     SSN: [id number] Body mass index is 27.57 kg/(m^2). YOB: 1960     AGE: 60 y.o. EX: female PCP: Yazan Espinosa NP IMPRESSION//  DIAGNOSIS AND TREATMENT PLAN  
  
  
DIAGNOSES 1. Pain of right heel Orders Placed This Encounter  Generic Supply Order Farzaneh Singh understands her diagnoses and the proposed plan. PLAN  HPI //  EXAMINATION Mio Tate IS A 60 y.o. female who presents to my outpatient office for evaluation of: We are going to see her in 6 weeks' time. She is doing better in terms of standing longer, walking longer, having less pain along her right hindfoot. She was in a CAM walker boot for right heel pain, and I last saw her on her office visit of 07/02/2018. At that time, I put her in a CAM walker boot because of her continued pain and discomfort to her heel. Trilaminar orthotics, she has been unable to get because it has been too costly. ANKLE/FOOT right Psychiatry: Alert, Oriented x 3; Speech normal in context and clarity,  
         Memory intact grossly, no involuntary movements - tremors, no dementia Gait: normal 
Tenderness: mild tenderness CENTRAL HEEL, Cutaneous: NO swelling Joint Motion: WNL Joint /Tendon Stability: Stability Testing: Peroneal tendon instability : not present Instability of ankle not present, Subtalar instability not present Alignment: mild varus Hindfoot, mild Metatarsus Adductus Metatarsus. Neuro Motor/Sensory: NL/NL, Vascular: NL foot/ankle pulses Lymphatics: No extremity lymphedema, No calf swelling, no tenderness to calf muscles. Visit Vitals  /72  Pulse 62  Temp 97.4 °F (36.3 °C) (Oral)  Resp 18  Ht 5' 7\" (1.702 m)  Wt 176 lb (79.8 kg)  SpO2 98%  BMI 27.57 kg/m2 MEDICAL CHART REVIEW Past Medical History:  
Diagnosis Date  Carotid duplex 11/18/2013 No significant occlusive disease bilaterally.  Chronic pain  GERD (gastroesophageal reflux disease)  Hiatal hernia  History of myocardial perfusion scan 11/12/2013 No evidence of ongoing ischemia. Mild inferior defect likely artifact. No RWMA. EF 57%. Neg EKG on pharm stress test.  
 Hypercholesterolemia  Lower back pain  Sciatic nerve pain  Sciatica  Unstable gait Current Outpatient Prescriptions Medication Sig  
 naproxen EC (NAPROSYN EC) 500 mg EC tablet Take 500 mg by mouth daily.  morphine IR (MS IR) 15 mg tablet Take  by mouth two (2) times a day.  pregabalin (LYRICA) 225 mg capsule Take 1 Cap by mouth two (2) times a day. Max Daily Amount: 450 mg.  
 HYDROcodone-acetaminophen (NORCO) 7.5-325 mg per tablet Take 1-2 Tabs by mouth nightly as needed for Pain. Max Daily Amount: 2 Tabs.  ranitidine (ZANTAC) 150 mg tablet TAKE 1 TAB BY MOUTH TWO TIMES A DAY FOR 30 DAYS  omeprazole (PRILOSEC) 40 mg capsule Take 40 mg by mouth daily.  atorvastatin (LIPITOR) 20 mg tablet Take 10 mg by mouth daily.  cyanocobalamin (VITAMIN B-12) 1,000 mcg sublingual tablet Take 1,000 mcg by mouth daily.  buPROPion (WELLBUTRIN) 100 mg tablet Take 100 mg by mouth daily.  meloxicam (MOBIC) 15 mg tablet TAKE 1 TABLET BY MOUTH DAILY WITH BREAKFAST  famotidine (PEPCID) 40 mg tablet TAKE 1 TABLET BY MOUTH EVERY DAY  atovaquone-proguanil (MALARONE) 250-100 mg per tablet TAKE 1 TAB BY MOUTH ONCE A DAY FOR 42 DAYS  pregabalin (LYRICA) 300 mg capsule Take 1 Cap by mouth two (2) times a day. Max Daily Amount: 600 mg.  
 montelukast (SINGULAIR) 10 mg tablet TAKE 1 TABLET ONCE A DAY  valACYclovir (VALTREX) 500 mg tablet 500 mg.  LOTEMAX 0.5 % drpg APPLY 1 DROP INTO LEFT EYE FOUR TIMES A DAY  loratadine (CLARITIN) 10 mg tablet TAKE 1 TAB BY MOUTH ONCE A DAY FOR 30 DAYS  lidocaine (LIDODERM) 5 % Apply patch to the affected area for 12 hours a day and remove for 12 hours a day.  traMADol (ULTRAM) 50 mg tablet Take 1 Tab by mouth two (2) times a day. Max Daily Amount: 100 mg.  ergocalciferol (VITAMIN D2) 50,000 unit capsule Take 50,000 Units by mouth every seven (7) days.  LORazepam (ATIVAN) 1 mg tablet Take 1 mg by mouth nightly as needed for Anxiety.  DOCOSAHEXANOIC ACID/EPA (FISH OIL PO) Take  by mouth.  HYDROcodone-acetaminophen (NORCO) 7.5-325 mg per tablet Take 1 Tab by mouth four (4) times daily for 30 days. As needed for breakthrough pain; fill on/after 12/21/13  Indications: PAIN  
 morphine CR (MS CONTIN) 15 mg CR tablet Take 1 Tab by mouth every six (6) hours for 30 days. For chronic pain; fill on/after 12/21/13  Indications: CHRONIC PAIN, NEUROPATHIC PAIN, SEVERE PAIN  
 morphine CR (MS CONTIN) 15 mg CR tablet Take 1 Tab by mouth every six (6) hours for 30 days. For chronic pain; fill on/after 11/20/13  Indications: CHRONIC PAIN, NEUROPATHIC PAIN, SEVERE PAIN  
 morphine CR (MS CONTIN) 15 mg CR tablet Take 1 Tab by mouth every six (6) hours for 30 days. For chronic pain; fill on/after 8/20/13 Indications: CHRONIC PAIN, NEUROPATHIC PAIN, SEVERE PAIN  
 venlafaxine (EFFEXOR) 75 mg tablet Take 1 Tab by mouth three (3) times daily for 30 days.  diclofenac epolamine (FLECTOR) 1.3 % PtMd transdermal patch 1 Patch by TransDERmal route two (2) times a day for 30 days. No current facility-administered medications for this visit. Allergies Allergen Reactions  Codeine Nausea and Vomiting  Morphine Nausea Only PO is ok just not IV Past Surgical History:  
Procedure Laterality Date  HX BREAST LUMPECTOMY    
 left breast  
 HX HYSTERECTOMY  1991  
 HX ORTHOPAEDIC Bilateral   
 Ankle  HX OTHER SURGICAL  2012  
 right ankle ligament reconstruction don by Dr. Andre Browning  HX TONSILLECTOMY    
 and adnoids  HX TUBAL LIGATION    
 bilateral  
 
Social History Occupational History  Not on file.   
 
Social History Main Topics  Smoking status: Former Smoker Packs/day: 0.50 Years: 35.00 Types: Cigarettes  Smokeless tobacco: Never Used  Alcohol use Yes Comment: rarely  Drug use: Yes Special: Marijuana  Sexual activity: No  
 
Family History Problem Relation Age of Onset  Heart Attack Maternal Grandmother  Heart Attack Paternal Grandmother  Heart Attack Paternal Grandfather  Heart Disease Mother   
  a-fib  High Cholesterol Mother  Hypertension Mother  Cancer Mother   
  colon  Heart Disease Father  Coronary Artery Disease Father  Hypertension Father  High Cholesterol Father  Pacemaker Father   
 
 
 
  
 REVIEW OF SYSTEMS : 8/1/2018  ALL BELOW ARE Negative except : SEE HPI Constitutional: Negative for fever, chills and weight loss. Neg Weight Loss Cardiovascular: Negative for chest pain, claudication and leg swelling. SOB, HAYES Gastrointestinal/Urological: Negative for pain, N/V/D/C, Blood in stool or urine,dysuria,  Hematuria, Incontinence Endocrine: See HPI Skin: see HPI Musculoskeletal: see HPI. Neurological: Negative for dizziness and weakness, headaches,Visual Changes or Confusion, or Seizures, Psychiatric/Behavioral: Negative for depression, memory loss and substance abuse. Extremities:  Negative for hair changes, rash or skin lesion changes. Hematologic: Negative for Bleeding problems, bruising, pallor or swollen lymph nodes. Peripheral Vascular: No calf pain, vascular vein tenderness to calf pain No calf throbbing, posterior knee throbbing pain DIAGNOSTIC IMAGING No notes on file Please see above section of this report. I have personally reviewed the results of the above study. The interpretation of this study is my professional opinion.  
 
 
Ernestina Zaldivar MD 
8/1/2018 
1:16 PM

## 2018-12-03 ENCOUNTER — OFFICE VISIT (OUTPATIENT)
Dept: ORTHOPEDIC SURGERY | Age: 58
End: 2018-12-03

## 2018-12-03 VITALS
SYSTOLIC BLOOD PRESSURE: 120 MMHG | TEMPERATURE: 97.6 F | BODY MASS INDEX: 26.37 KG/M2 | DIASTOLIC BLOOD PRESSURE: 79 MMHG | HEIGHT: 67 IN | WEIGHT: 168 LBS | RESPIRATION RATE: 16 BRPM | HEART RATE: 65 BPM | OXYGEN SATURATION: 95 %

## 2018-12-03 DIAGNOSIS — M19.071 OSTEOARTHRITIS OF RIGHT MIDFOOT: ICD-10-CM

## 2018-12-03 DIAGNOSIS — M72.2 PLANTAR FASCIA SYNDROME: Primary | ICD-10-CM

## 2018-12-03 DIAGNOSIS — M79.671 RIGHT FOOT PAIN: ICD-10-CM

## 2018-12-03 NOTE — PATIENT INSTRUCTIONS
Please follow up after MRI. You are advised to contact us if your condition worsens. An MRI or CT has been ordered for you. A [employer] will be contacting you to schedule the appointment as soon as it has been approved with your insurance company. Please schedule an appointment to follow up with the doctor or the physicians assistant after the MRI or CT has been conducted. Look at [facility] One or MBT style shoes at. .. [employer] Address: [id number], [place], [address] Phone: [phone] Heel Pain: Care Instructions Your Care Instructions You can have heel pain from an injury or from everyday overuse, such as running or walking a lot. Plantar fasciitis is the most common cause of heel pain. In this condition, the bottom of your foot from the front of the heel to the base of the toes is sore and hard to walk on. Your heel can get better with rest, anti-inflammatory pain medicines, and stretching exercises. Follow-up care is a key part of your treatment and safety. Be sure to make and go to all appointments, and call your doctor if you are having problems. It's also a good idea to know your test results and keep a list of the medicines you take. How can you care for yourself at home? · Rest your feet often. Reduce your activity to a level that lets you avoid pain. If possible, do not run or walk on hard surfaces. · Take anti-inflammatory medicines to reduce heel pain. These include ibuprofen (Advil, Motrin) and naproxen (Aleve). Read and follow all instructions on the label. · Put ice or a cold pack on your heel for 10 to 20 minutes at a time. Try to do this every 1 to 2 hours for the next 3 days (when you are awake). Put a thin cloth between the ice and your skin. · If ice isn't helping after 2 or 3 days, try heat, such as a heating pad set on low. · If your doctor says it is okay, try these calf stretches.  Tight calf muscles can cause heel pain or make it worse. ? Stand about 1 foot from a wall. Place the palms of both hands against the wall at chest level and lean forward against the wall. Put the leg you want to stretch about a step behind your other leg. Keep your back heel on the floor and bend your front knee until you feel a stretch in the back leg. Hold this position for 15 to 30 seconds. Repeat the exercise 2 to 4 times a session. Do 3 to 4 sessions a day. ? Sit down on the floor or a mat with your feet stretched in front of you. Roll up a towel lengthwise, and loop it over the ball of your foot. Holding the towel at both ends, gently pull the towel toward you to stretch your foot. Hold this position for 15 to 30 seconds. Repeat the exercise 2 to 4 times a session. Do 3 to 4 sessions a day. · Wear a night splint if your doctor suggests it. A night splint holds your foot with the toes pointed up. This position gives the bottom of your foot a constant, gentle stretch. · Wear shoes with good arch support. Athletic shoes or shoes with a well-cushioned sole are good choices. · Try a heel lift, heel cup or shoe insert (orthotic) to help cushion your heel. You can buy these at many shoe stores. Use them in both shoes, even if only one foot hurts. · Maintain a healthy weight. This puts less strain on your feet. When should you call for help? Call your doctor now or seek immediate medical care if: 
  · You have heel pain with fever, redness, or warmth in your heel.  
  · You have numbness or tingling in your heel.  
 Watch closely for changes in your health, and be sure to contact your doctor if: 
  · You cannot put weight on the sore foot.  
  · Your heel pain lasts more than 2 weeks. Where can you learn more? Go to http://ariel-lucía.info/. Enter S299 in the search box to learn more about \"Heel Pain: Care Instructions. \" Current as of: November 20, 2017 Content Version: 11.8 © 3974-3951 [employer]. Care instructions adapted under license by [employer] (which disclaims liability or warranty for this information). If you have questions about a medical condition or this instruction, always ask your healthcare professional. [address] any warranty or liability for your use of this information.

## 2018-12-03 NOTE — PROGRESS NOTES
AMBULATORY PROGRESS NOTE Patient: Tasha Thomas             MRN: [id number]     SSN: [id number] Body mass index is 26.31 kg/m². YOB: 1960     AGE: 60 y.o. EX: female PCP: Idania Allison NP IMPRESSION/DIAGNOSIS AND TREATMENT PLAN  
 
DIAGNOSES 1. Plantar fascia syndrome 2. Right foot pain 3. Osteoarthritis of right midfoot Orders Placed This Encounter  AMB SUPPLY ORDER  
 MRI ANKLE RT WO CONT Farzaneh Deal understands her diagnoses and the proposed plan. She continues to have pain and discomfort to her foot along the inferior portion of her hindfoot along the plantar fascia. She has failed conservative treatment which includes anti-inflammatories, activity modification, and shoe wear changes. Additional recommendations are listed as below for her. Because of her continued pain and discomfort, recommendation is for an MRI of her hindfoot to assess the calcaneus and the plantar fascia. Plan: 
 
1) DME Order: Bernestine Hall One or MBT style shoe. 2) MRI without IV Contrast of the right ankle; please assess calcaneus and hindfoot. 3) Continue activity modification as directed. RTO - after MRI 
 HPI AND EXAMINATION Tasha Thomas IS A 60 y.o. female who presents to my outpatient office for follow up of right heel pain. At the last visit, I provided an order for a CAM walker boot and instructed the patient to continue activity modification as directed. Since we saw her last, Ms. Washington Santa states that she is still experiencing a 6/10 stabbing pain in her right heel and constant swelling. She notes that in the mornings, she does not experience much pain because she is rested, but once she has walked for a bit, she begins to experience the pain. She tries to elevate her foot, but once she stands up, she feels as if she wants to fall from the pain.  She states that if she were to miss a dose of her Morphine or hydrocodone, her pain would be much worse. Ms. Unique Barrientos states that she has difficulty sleeping through the night, but she believes this is from her hip pain. Dr. Arely Willett is following her for the hip pain. She presents to the office today with her Visco heel inserts. She reports that she still occasionally wears her CAM walker boot when her pain becomes severe. She notes that she has not tried Cortisone injections yet. As it regards to her left foot, Ms. Unique Barrientos states that she has some pain in her dorsal foot and soreness in her calf after wearing her CAM walker boot on the opposite side. The patient is currently in Pain Management and is on Morphine twice a day, extended release, and hydrocodone. Visit Vitals /79 Pulse 65 Temp 97.6 °F (36.4 °C) Resp 16 Ht 5' 7\" (1.702 m) Wt 168 lb (76.2 kg) SpO2 95% BMI 26.31 kg/m² ANKLE/FOOT right 
  
Psychiatry: Alert, Oriented x 3; Speech normal in context and clarity,  
                    Memory intact grossly, no involuntary movements - tremors, no dementia Gait: normal 
Tenderness: mild tenderness CENTRAL HEEL, Cutaneous: NO swelling Joint Motion: WNL Joint /Tendon Stability: Stability Testing: Peroneal tendon instability : not present Instability of ankle not present, Subtalar instability not present Alignment: mild varus Hindfoot, mild Metatarsus Adductus Metatarsus. Neuro Motor/Sensory: NL/NL, Vascular: NL foot/ankle pulses Lymphatics: No extremity lymphedema, No calf swelling, no tenderness to calf muscles. CHART REVIEW Past Medical History:  
Diagnosis Date  Carotid duplex 11/18/2013 No significant occlusive disease bilaterally.  Chronic pain  GERD (gastroesophageal reflux disease)  Hiatal hernia  History of myocardial perfusion scan 11/12/2013 No evidence of ongoing ischemia. Mild inferior defect likely artifact. No RWMA. EF 57%.   Neg EKG on pharm stress test.  
  Hypercholesterolemia  Lower back pain  Sciatic nerve pain  Sciatica  Unstable gait Current Outpatient Medications Medication Sig  
 meloxicam (MOBIC) 15 mg tablet TAKE 1 TABLET BY MOUTH DAILY WITH BREAKFAST  famotidine (PEPCID) 40 mg tablet TAKE 1 TABLET BY MOUTH EVERY DAY  naproxen EC (NAPROSYN EC) 500 mg EC tablet Take 500 mg by mouth daily.  morphine IR (MS IR) 15 mg tablet Take  by mouth two (2) times a day.  atovaquone-proguanil (MALARONE) 250-100 mg per tablet TAKE 1 TAB BY MOUTH ONCE A DAY FOR 42 DAYS  pregabalin (LYRICA) 225 mg capsule Take 1 Cap by mouth two (2) times a day. Max Daily Amount: 450 mg.  
 HYDROcodone-acetaminophen (NORCO) 7.5-325 mg per tablet Take 1-2 Tabs by mouth nightly as needed for Pain. Max Daily Amount: 2 Tabs.  pregabalin (LYRICA) 300 mg capsule Take 1 Cap by mouth two (2) times a day. Max Daily Amount: 600 mg.  
 montelukast (SINGULAIR) 10 mg tablet TAKE 1 TABLET ONCE A DAY  valACYclovir (VALTREX) 500 mg tablet 500 mg.  LOTEMAX 0.5 % drpg APPLY 1 DROP INTO LEFT EYE FOUR TIMES A DAY  loratadine (CLARITIN) 10 mg tablet TAKE 1 TAB BY MOUTH ONCE A DAY FOR 30 DAYS  ranitidine (ZANTAC) 150 mg tablet TAKE 1 TAB BY MOUTH TWO TIMES A DAY FOR 30 DAYS  lidocaine (LIDODERM) 5 % Apply patch to the affected area for 12 hours a day and remove for 12 hours a day.  traMADol (ULTRAM) 50 mg tablet Take 1 Tab by mouth two (2) times a day. Max Daily Amount: 100 mg.  
 omeprazole (PRILOSEC) 40 mg capsule Take 40 mg by mouth daily.  ergocalciferol (VITAMIN D2) 50,000 unit capsule Take 50,000 Units by mouth every seven (7) days.  atorvastatin (LIPITOR) 20 mg tablet Take 10 mg by mouth daily.  LORazepam (ATIVAN) 1 mg tablet Take 1 mg by mouth nightly as needed for Anxiety.  cyanocobalamin (VITAMIN B-12) 1,000 mcg sublingual tablet Take 1,000 mcg by mouth daily.  buPROPion (WELLBUTRIN) 100 mg tablet Take 100 mg by mouth daily.  DOCOSAHEXANOIC ACID/EPA (FISH OIL PO) Take  by mouth.  HYDROcodone-acetaminophen (NORCO) 7.5-325 mg per tablet Take 1 Tab by mouth four (4) times daily for 30 days. As needed for breakthrough pain; fill on/after 12/21/13  Indications: PAIN  
 morphine CR (MS CONTIN) 15 mg CR tablet Take 1 Tab by mouth every six (6) hours for 30 days. For chronic pain; fill on/after 12/21/13  Indications: CHRONIC PAIN, NEUROPATHIC PAIN, SEVERE PAIN  
 morphine CR (MS CONTIN) 15 mg CR tablet Take 1 Tab by mouth every six (6) hours for 30 days. For chronic pain; fill on/after 11/20/13  Indications: CHRONIC PAIN, NEUROPATHIC PAIN, SEVERE PAIN  
 morphine CR (MS CONTIN) 15 mg CR tablet Take 1 Tab by mouth every six (6) hours for 30 days. For chronic pain; fill on/after 8/20/13 Indications: CHRONIC PAIN, NEUROPATHIC PAIN, SEVERE PAIN  
 venlafaxine (EFFEXOR) 75 mg tablet Take 1 Tab by mouth three (3) times daily for 30 days.  diclofenac epolamine (FLECTOR) 1.3 % PtMd transdermal patch 1 Patch by TransDERmal route two (2) times a day for 30 days. No current facility-administered medications for this visit. Allergies Allergen Reactions  Codeine Nausea and Vomiting  Morphine Nausea Only PO is ok just not IV Past Surgical History:  
Procedure Laterality Date  HX BREAST LUMPECTOMY    
 left breast  
 HX HYSTERECTOMY  1991  
 HX ORTHOPAEDIC Bilateral   
 Ankle  HX OTHER SURGICAL  2012  
 right ankle ligament reconstruction don by Dr. Steven Carter  HX TONSILLECTOMY    
 and adnoids  HX TUBAL LIGATION    
 bilateral  
 
Social History Occupational History  Not on file Tobacco Use  Smoking status: Former Smoker Packs/day: 0.50 Years: 35.00 Pack years: 17.50 Types: Cigarettes  Smokeless tobacco: Never Used Substance and Sexual Activity  Alcohol use: Yes Comment: rarely  Drug use: Yes Types: Marijuana  Sexual activity: No  
  Partners: Male Family History Problem Relation Age of Onset  Heart Attack Maternal Grandmother  Heart Attack Paternal Grandmother  Heart Attack Paternal Grandfather  Heart Disease Mother   
     a-fib  High Cholesterol Mother  Hypertension Mother  Cancer Mother   
     colon  Heart Disease Father  Coronary Artery Disease Father  Hypertension Father  High Cholesterol Father  Pacemaker Father REVIEW OF SYSTEMS : 12/3/2018  ALL BELOW ARE Negative except : SEE HPI Constitutional: Negative for fever, chills and weight loss. Neg Weight Loss Cardiovascular: Negative for chest pain, claudication and leg swelling. SOB, HAYES Gastrointestinal/Urological: Negative for  pain, N/V/D/C, Blood in stool or urine,dysuria                         Hematuria, Incontinence, pelvic pain Musculoskeletal: see HPI. Neurological: Negative for dizziness and weakness, headaches,Visual Changes             Confusion,  Or Seizures, Psychiatric/Behavioral: Negative for depression, memory loss and substance abuse. Extremities:  Negative for hair changes, rash or skin lesion changes. Hematologic: Negative for Bleeding problems, bruising, pallor or swollen lymph nodes. Peripheral Vascular: No calf pain, vascular vein tenderness to calf pain No calf throbbing, posterior knee throbbing pain DIAGNOSTIC IMAGING No notes on file Please see above section of this report. I have personally reviewed the results of the above study. The interpretation of this study is my professional opinion. Written by Lucy Oliveira, as dictated by Dr. Comfort Keene. I, Dr. Comfort Keene, confirm that all documentation is accurate.

## 2018-12-12 ENCOUNTER — HOSPITAL ENCOUNTER (OUTPATIENT)
Age: 58
Discharge: HOME | End: 2018-12-12
Attending: ORTHOPAEDIC SURGERY
Payer: COMMERCIAL

## 2018-12-12 DIAGNOSIS — M79.671 RIGHT FOOT PAIN: ICD-10-CM

## 2018-12-12 PROCEDURE — 73721 MRI JNT OF LWR EXTRE W/O DYE: CPT

## 2020-07-30 PROBLEM — N64.59 INVERTED NIPPLE: Status: ACTIVE | Noted: 2020-07-30

## 2020-07-30 RX ORDER — GABAPENTIN 300 MG/1
300 CAPSULE ORAL 3 TIMES DAILY
COMMUNITY

## 2020-07-30 RX ORDER — LANOLIN ALCOHOL/MO/W.PET/CERES
500 CREAM (GRAM) TOPICAL DAILY
COMMUNITY

## 2020-07-30 RX ORDER — BUPROPION HYDROCHLORIDE 300 MG/1
300 TABLET ORAL
COMMUNITY
End: 2020-07-31 | Stop reason: ALTCHOICE

## 2020-07-30 RX ORDER — ESCITALOPRAM OXALATE 5 MG/1
TABLET ORAL DAILY
COMMUNITY

## 2020-07-31 ENCOUNTER — OFFICE VISIT (OUTPATIENT)
Dept: SURGERY | Age: 60
End: 2020-07-31
Payer: COMMERCIAL

## 2020-07-31 VITALS
HEART RATE: 71 BPM | WEIGHT: 133.8 LBS | HEIGHT: 66 IN | DIASTOLIC BLOOD PRESSURE: 59 MMHG | TEMPERATURE: 98.2 F | SYSTOLIC BLOOD PRESSURE: 99 MMHG | BODY MASS INDEX: 21.5 KG/M2 | RESPIRATION RATE: 16 BRPM

## 2020-07-31 DIAGNOSIS — N64.59 INVERSION OF LEFT NIPPLE: ICD-10-CM

## 2020-07-31 DIAGNOSIS — R92.8 ABNORMAL MAMMOGRAPHY: ICD-10-CM

## 2020-07-31 DIAGNOSIS — N64.59 INVERSION OF NIPPLE: Primary | ICD-10-CM

## 2020-07-31 PROCEDURE — 99204 OFFICE O/P NEW MOD 45 MIN: CPT | Performed by: SURGERY

## 2020-07-31 NOTE — PROGRESS NOTES
1. Have you been to the ER, urgent care clinic since your last visit? Hospitalized since your last visit? No    2. Have you seen or consulted any other health care providers outside of the [address] [place] [address] since your last visit? Include any pap smears or colon screening. No       Inverted Left Breast Nipple.

## 2020-08-02 NOTE — PROGRESS NOTES
This is the first [facility] [address] visit for this 63y.o.-year-old woman who presents with left nipple inversion      HISTORY OF PRESENT ILLNESS: Ms Farzaneh Moore is a 63y.o.-year-old woman who presents for left nipple inversion that started about 3 months ago. Says it no longer protrudes as the right side does. She denies any palpable masses, nipple discharge, skin changes, or axillary adenopathy. She denies breast pain. She does not have any significant past history for breast diseases or breast biopsy. Breast cancer risk factors:  Menarche at age 14    Age at first live birth: 24  postmenopausal.  OCP use: denies  HRT use: denies  Infertility treatment:  denies    FAMILY HISTORY:  Maternal grandmother w breast cancer in her 45s  Mom w colon and stomach cancer at [de-identified]  Limited known family history    Past Medical History:   Diagnosis Date    Carotid duplex 2013    No significant occlusive disease bilaterally.  Chronic pain     Gastritis     GERD (gastroesophageal reflux disease)     Hiatal hernia     Hiatal hernia     History of myocardial perfusion scan 2013    No evidence of ongoing ischemia. Mild inferior defect likely artifact. No RWMA. EF 57%.   Neg EKG on pharm stress test.    Hypercholesterolemia     Inverted nipple 2020    Lower back pain     Sciatic nerve pain     Sciatica     Unstable gait          Past Surgical History:   Procedure Laterality Date    HX BREAST LUMPECTOMY      left breast    HX HYSTERECTOMY      HX ORTHOPAEDIC Bilateral     Ankle    HX OTHER SURGICAL  2012    right ankle ligament reconstruction don by Dr. Ennis Stage      and adnoids    HX TUBAL LIGATION      bilateral         Social History     Socioeconomic History    Marital status:      Spouse name: Not on file    Number of children: Not on file    Years of education: Not on file    Highest education level: Not on file   Tobacco Use    Smoking status: Former Smoker     Packs/day: 0.50     Years: 35.00     Pack years: 17.50     Types: Cigarettes    Smokeless tobacco: Never Used   Substance and Sexual Activity    Alcohol use: Yes     Comment: rarely    Drug use: Yes     Types: Marijuana    Sexual activity: Never     Partners: Male   Social History Narrative    ** Merged History Encounter **              Allergies   Allergen Reactions    Codeine Nausea and Vomiting    Morphine Nausea Only     PO is ok just not IV          Current Outpatient Medications   Medication Sig Dispense Refill    naloxegoL (Movantik) 12.5 mg tab tablet Take  by mouth Daily (before breakfast).  gabapentin (NEURONTIN) 300 mg capsule Take 300 mg by mouth three (3) times daily.  escitalopram oxalate (Lexapro) 5 mg tablet Take  by mouth daily.  cyanocobalamin (VITAMIN B12) 500 mcg tablet Take 500 mcg by mouth daily.  meloxicam (MOBIC) 15 mg tablet TAKE 1 TABLET BY MOUTH DAILY WITH BREAKFAST 30 Tab 0    famotidine (PEPCID) 40 mg tablet TAKE 1 TABLET BY MOUTH EVERY DAY 30 Tab 0    naproxen EC (NAPROSYN EC) 500 mg EC tablet Take 500 mg by mouth daily.  atovaquone-proguanil (MALARONE) 250-100 mg per tablet TAKE 1 TAB BY MOUTH ONCE A DAY FOR 42 DAYS  0    HYDROcodone-acetaminophen (NORCO) 7.5-325 mg per tablet Take 1-2 Tabs by mouth nightly as needed for Pain. Max Daily Amount: 2 Tabs. 60 Tab 0    pregabalin (LYRICA) 300 mg capsule Take 1 Cap by mouth two (2) times a day. Max Daily Amount: 600 mg. 60 Cap 1    montelukast (SINGULAIR) 10 mg tablet TAKE 1 TABLET ONCE A DAY  0    valACYclovir (VALTREX) 500 mg tablet 500 mg.       LOTEMAX 0.5 % drpg APPLY 1 DROP INTO LEFT EYE FOUR TIMES A DAY  3    loratadine (CLARITIN) 10 mg tablet TAKE 1 TAB BY MOUTH ONCE A DAY FOR 30 DAYS  6    ranitidine (ZANTAC) 150 mg tablet TAKE 1 TAB BY MOUTH TWO TIMES A DAY FOR 30 DAYS  3    lidocaine (LIDODERM) 5 % Apply patch to the affected area for 12 hours a day and remove for 12 hours a day. 1 Each 0    traMADol (ULTRAM) 50 mg tablet Take 1 Tab by mouth two (2) times a day. Max Daily Amount: 100 mg. 50 Tab 0    omeprazole (PRILOSEC) 40 mg capsule Take 40 mg by mouth daily.  atorvastatin (LIPITOR) 20 mg tablet Take 10 mg by mouth daily.  LORazepam (ATIVAN) 1 mg tablet Take 1 mg by mouth nightly as needed for Anxiety.  buPROPion (WELLBUTRIN) 100 mg tablet Take 100 mg by mouth daily.  DOCOSAHEXANOIC ACID/EPA (FISH OIL PO) Take  by mouth.  venlafaxine (EFFEXOR) 75 mg tablet Take 1 Tab by mouth three (3) times daily for 30 days. 90 Tab 5    diclofenac epolamine (FLECTOR) 1.3 % PtMd transdermal patch 1 Patch by TransDERmal route two (2) times a day for 30 days. 60 Patch 5         REVIEW OF SYSTEMS:  General: normal, no unintentional weight loss  HEENT: normal, no lymphadenopathy  Cardiovascular: normal, no chest pain  Respiratory: no cough, shortness of breath, or wheezing  BREAST: left nipple inversion, no nipple discharge  GI: normal, no blood in urine or stool  : normal, no hematuria  Musculoskeletal: no back pain  Integumentary: no abnormal skin lesions  Neuro: no memory changes, dizziness, loss of consciousness  Psych: no mood disorders, feels safe in a relationship      PHYSICAL EXAM:  Patient is a 63y.o.-year-old woman  General Appearance: healthy-appearing, no acute distress, ambulating normally  Head: normocephalic  Neck: supple and no masses  Lymph Nodes: no cervical LAD, supraclavicular LAD, or axillary LAD    BREASTS: symmetric  RIGHT BREAST: no erythema, induration, tenderness, ecchymosis, skin changes, abnormal secretions, or axillary lymphadenopathy and normal nipple appearance, no masses  LEFT BREAST: no erythema, induration, tenderness, ecchymosis, abnormal secretions, or axillary lymphadenopathy.  Positive for left nipple inversion and mild scaling of the nipple at the center portion  Lungs: no dyspnea  Back: normal curvature  Abdomen: soft and no tenderness, no hepatomegaly, no splenomegaly  Skin: no jaundice  Musculoskeletal: Extremities w no edema, normal motor strength, normal movement of all extremities  Neurologic: Cranial Nerves: grossly intact. Sensation: grossly intact  Psychiatric: good judgement and insight, active and alert and normal mood      RADIOLOGIC WORK-UP: Radiology films and reports were reviewed. Left dx mmg 05.08.2020 demonstrates heterogeneously dense breasts, no masses or evidence of malignancy in the left breast. BIRADS-2. PROCEDURE:  The risks and benefits of the procedure were described including bleeding, infection, pain and anticipated recovery course. Patient expressed verbally that she understood and the decision to proceed was made. After verbal informed consent, the left breast was chloraprepped and a sterile drape applied. 10cc of 1% lidocaine with epinephrine [id number] infiltrated around the nipple. A shave biopsy was performed at the center of the nipple where there was some scaling. Dressing was applied, hemostasis obtained via direct pressure, specimen submitted and post-procedure care instructions given. IMPRESSION:  63y.o.-year old woman with left nipple inversion    DISCUSSION AND PLAN:  I discussed the results of the physical examination and imaging with the patient. After performing an exam in the office, I determined the need for the patient to undergo a shave breast biopsy of the left nipple. There is scaling and inversion that is suspicious for malignancy, currently with unknown diagnosis. She understood and agreed with the plan and procedure and possible risks of biopsy, including infection, bleeding, future surgery. She tolerated the biopsy well. I will call her with the results. She will return to clinic to see me in one week for reevaluation. She was advised to call the office w any questions or concerns. All questions were answered to her satisfaction. This encounter lasted 45 minutes, and over 50% of this visit was spent in counseling the patient and coordination of care.

## 2020-08-07 ENCOUNTER — OFFICE VISIT (OUTPATIENT)
Dept: SURGERY | Age: 60
End: 2020-08-07

## 2020-08-07 VITALS
WEIGHT: 130.8 LBS | DIASTOLIC BLOOD PRESSURE: 62 MMHG | TEMPERATURE: 98 F | BODY MASS INDEX: 21.02 KG/M2 | HEIGHT: 66 IN | HEART RATE: 57 BPM | SYSTOLIC BLOOD PRESSURE: 102 MMHG

## 2020-08-07 DIAGNOSIS — N64.59 INVERSION OF LEFT NIPPLE: Primary | ICD-10-CM

## 2020-08-07 PROCEDURE — 99213 OFFICE O/P EST LOW 20 MIN: CPT | Performed by: SURGERY

## 2020-08-07 NOTE — PROGRESS NOTES
1. Have you been to the ER, urgent care clinic since your last visit? Hospitalized since your last visit? No    2. Have you seen or consulted any other health care providers outside of the [address] [place] [address] since your last visit? Include any pap smears or colon screening. No       Patient is here for f/u nipple inversion. No other complaints.

## 2020-08-07 NOTE — PROGRESS NOTES
This is a follow-up [address] visit for this 63y.o.-year-old woman who presents with left nipple inversion      HISTORY OF PRESENT ILLNESS: Ms Farzaneh Camilo is a 63y.o.-year-old woman who presents for left nipple inversion that started about 3 months ago. Says it no longer protrudes as the right side does. She denies any palpable masses, nipple discharge, skin changes, or axillary adenopathy. She denies breast pain. She does not have any significant past history for breast diseases or breast biopsy. INTERVAL HISTORY: doing well since last clinic visit. Underwent left nipple shave biopsy at last clinic visit. Nipple has occasional stinging pain but otherwise no new complaints. No bloody discharge or clear discharge from the nipples b/l. Breast cancer risk factors:  Menarche at age 14    Age at first live birth: 24  postmenopausal.  OCP use: denies  HRT use: denies  Infertility treatment:  denies    FAMILY HISTORY:  Maternal grandmother w breast cancer in her 45s  Mom w colon and stomach cancer at [de-identified]  Limited known family history    Past Medical History:   Diagnosis Date    Carotid duplex 2013    No significant occlusive disease bilaterally.  Chronic pain     Gastritis     GERD (gastroesophageal reflux disease)     Hiatal hernia     Hiatal hernia     History of myocardial perfusion scan 2013    No evidence of ongoing ischemia. Mild inferior defect likely artifact. No RWMA. EF 57%.   Neg EKG on pharm stress test.    Hypercholesterolemia     Inverted nipple 2020    Lower back pain     Sciatic nerve pain     Sciatica     Unstable gait          Past Surgical History:   Procedure Laterality Date    HX BREAST LUMPECTOMY      left breast    HX HYSTERECTOMY      HX ORTHOPAEDIC Bilateral     Ankle    HX OTHER SURGICAL      right ankle ligament reconstruction don by Dr. Cata Interiano      and adnoids    HX TUBAL LIGATION      bilateral         Social History     Socioeconomic History    Marital status:      Spouse name: Not on file    Number of children: Not on file    Years of education: Not on file    Highest education level: Not on file   Tobacco Use    Smoking status: Former Smoker     Packs/day: 0.50     Years: 35.00     Pack years: 17.50     Types: Cigarettes    Smokeless tobacco: Never Used   Substance and Sexual Activity    Alcohol use: Yes     Comment: rarely    Drug use: Yes     Types: Marijuana    Sexual activity: Never     Partners: Male   Social History Narrative    ** Merged History Encounter **              Allergies   Allergen Reactions    Codeine Nausea and Vomiting    Morphine Nausea Only     PO is ok just not IV          Current Outpatient Medications   Medication Sig Dispense Refill    naloxegoL (Movantik) 12.5 mg tab tablet Take  by mouth Daily (before breakfast).  gabapentin (NEURONTIN) 300 mg capsule Take 300 mg by mouth three (3) times daily.  escitalopram oxalate (Lexapro) 5 mg tablet Take  by mouth daily.  cyanocobalamin (VITAMIN B12) 500 mcg tablet Take 500 mcg by mouth daily.  meloxicam (MOBIC) 15 mg tablet TAKE 1 TABLET BY MOUTH DAILY WITH BREAKFAST 30 Tab 0    famotidine (PEPCID) 40 mg tablet TAKE 1 TABLET BY MOUTH EVERY DAY 30 Tab 0    naproxen EC (NAPROSYN EC) 500 mg EC tablet Take 500 mg by mouth daily.  atovaquone-proguanil (MALARONE) 250-100 mg per tablet TAKE 1 TAB BY MOUTH ONCE A DAY FOR 42 DAYS  0    HYDROcodone-acetaminophen (NORCO) 7.5-325 mg per tablet Take 1-2 Tabs by mouth nightly as needed for Pain. Max Daily Amount: 2 Tabs. 60 Tab 0    pregabalin (LYRICA) 300 mg capsule Take 1 Cap by mouth two (2) times a day. Max Daily Amount: 600 mg. 60 Cap 1    montelukast (SINGULAIR) 10 mg tablet TAKE 1 TABLET ONCE A DAY  0    valACYclovir (VALTREX) 500 mg tablet 500 mg.       LOTEMAX 0.5 % drpg APPLY 1 DROP INTO LEFT EYE FOUR TIMES A DAY  3  loratadine (CLARITIN) 10 mg tablet TAKE 1 TAB BY MOUTH ONCE A DAY FOR 30 DAYS  6    ranitidine (ZANTAC) 150 mg tablet TAKE 1 TAB BY MOUTH TWO TIMES A DAY FOR 30 DAYS  3    lidocaine (LIDODERM) 5 % Apply patch to the affected area for 12 hours a day and remove for 12 hours a day. 1 Each 0    traMADol (ULTRAM) 50 mg tablet Take 1 Tab by mouth two (2) times a day. Max Daily Amount: 100 mg. 50 Tab 0    omeprazole (PRILOSEC) 40 mg capsule Take 40 mg by mouth daily.  atorvastatin (LIPITOR) 20 mg tablet Take 10 mg by mouth daily.  LORazepam (ATIVAN) 1 mg tablet Take 1 mg by mouth nightly as needed for Anxiety.  buPROPion (WELLBUTRIN) 100 mg tablet Take 100 mg by mouth daily.  DOCOSAHEXANOIC ACID/EPA (FISH OIL PO) Take  by mouth.  venlafaxine (EFFEXOR) 75 mg tablet Take 1 Tab by mouth three (3) times daily for 30 days. 90 Tab 5    diclofenac epolamine (FLECTOR) 1.3 % PtMd transdermal patch 1 Patch by TransDERmal route two (2) times a day for 30 days.  60 Patch 5         REVIEW OF SYSTEMS:  General: normal, no unintentional weight loss  HEENT: normal, no lymphadenopathy  Cardiovascular: normal, no chest pain  Respiratory: no cough, shortness of breath, or wheezing  BREAST: left nipple inversion, no nipple discharge  GI: normal, no blood in urine or stool  : normal, no hematuria  Musculoskeletal: no back pain  Integumentary: no abnormal skin lesions  Neuro: no memory changes, dizziness, loss of consciousness  Psych: no mood disorders, feels safe in a relationship      PHYSICAL EXAM:  Patient is a 63y.o.-year-old woman  General Appearance: healthy-appearing, no acute distress, ambulating normally  Head: normocephalic  Neck: supple and no masses  Lymph Nodes: no cervical LAD, supraclavicular LAD, or axillary LAD    BREASTS: symmetric  RIGHT BREAST: no erythema, induration, tenderness, ecchymosis, skin changes, abnormal secretions, or axillary lymphadenopathy and normal nipple appearance, no masses  LEFT BREAST: no erythema, induration, tenderness, ecchymosis, abnormal secretions, or axillary lymphadenopathy. Positive for left nipple inversion. Shave biopsy site healing well. Lungs: no dyspnea  Back: normal curvature  Abdomen: soft and no tenderness, no hepatomegaly, no splenomegaly  Skin: no jaundice  Musculoskeletal: Extremities w no edema, normal motor strength, normal movement of all extremities  Neurologic: Cranial Nerves: grossly intact. Sensation: grossly intact  Psychiatric: good judgement and insight, active and alert and normal mood      RADIOLOGIC WORK-UP: Radiology films and reports were reviewed. Left dx mmg 05.08.2020 demonstrates heterogeneously dense breasts, no masses or evidence of malignancy in the left breast. BIRADS-2. IMPRESSION:  63y.o.-year old woman with left nipple inversion    DISCUSSION AND PLAN:  I discussed the pathology with the patient and her  and advised her that the results are benign. There are no additional diagnostic or therapeutic interventions indicated at this time. She is doing well postprocedurally. I recommended that she continue usual care with her regular physician, and return to see me for re-evaluation in six months. She was encouraged to call the office if she noticed a change or had additional questions. All her questions were answered to her satisfaction. This encounter lasted 20 minutes and > 50% was devoted to a discussion of management options.

## 2022-03-18 PROBLEM — N64.59 INVERTED NIPPLE: Status: ACTIVE | Noted: 2020-07-30

## 2023-05-12 RX ORDER — ESCITALOPRAM OXALATE 5 MG/1
TABLET ORAL DAILY
COMMUNITY

## 2023-05-12 RX ORDER — VALACYCLOVIR HYDROCHLORIDE 500 MG/1
TABLET, FILM COATED ORAL
COMMUNITY

## 2023-05-12 RX ORDER — TRAMADOL HYDROCHLORIDE 50 MG/1
TABLET ORAL 2 TIMES DAILY
COMMUNITY
Start: 2016-06-20

## 2023-05-12 RX ORDER — FAMOTIDINE 40 MG/1
1 TABLET, FILM COATED ORAL DAILY
COMMUNITY
Start: 2018-08-01

## 2023-05-12 RX ORDER — PREGABALIN 300 MG/1
CAPSULE ORAL 2 TIMES DAILY
COMMUNITY
Start: 2017-01-11

## 2023-05-12 RX ORDER — NAPROXEN 500 MG/1
500 TABLET ORAL DAILY
COMMUNITY

## 2023-05-12 RX ORDER — BUPROPION HYDROCHLORIDE 100 MG/1
100 TABLET ORAL DAILY
COMMUNITY

## 2023-05-12 RX ORDER — GABAPENTIN 300 MG/1
CAPSULE ORAL 3 TIMES DAILY
COMMUNITY

## 2023-05-12 RX ORDER — HYDROCODONE BITARTRATE AND ACETAMINOPHEN 7.5; 325 MG/1; MG/1
1-2 TABLET ORAL
COMMUNITY
Start: 2017-02-01

## 2023-05-12 RX ORDER — MELOXICAM 15 MG/1
1 TABLET ORAL DAILY
COMMUNITY
Start: 2018-08-01

## 2023-05-12 RX ORDER — DICLOFENAC EPOLAMINE 0.01 G/1
1 SYSTEM TOPICAL 2 TIMES DAILY
COMMUNITY
Start: 2012-06-13

## 2023-05-12 RX ORDER — LORATADINE 10 MG/1
TABLET ORAL
COMMUNITY
Start: 2016-10-25

## 2023-05-12 RX ORDER — ATOVAQUONE AND PROGUANIL HYDROCHLORIDE 250; 100 MG/1; MG/1
TABLET, FILM COATED ORAL
COMMUNITY
Start: 2017-05-04

## 2023-05-12 RX ORDER — LIDOCAINE 50 MG/G
PATCH TOPICAL
COMMUNITY
Start: 2016-06-29

## 2023-05-12 RX ORDER — RANITIDINE 150 MG/1
TABLET ORAL
COMMUNITY
Start: 2016-09-21

## 2023-05-12 RX ORDER — OMEPRAZOLE 40 MG/1
40 CAPSULE, DELAYED RELEASE ORAL DAILY
COMMUNITY

## 2023-05-12 RX ORDER — MONTELUKAST SODIUM 10 MG/1
TABLET ORAL
COMMUNITY
Start: 2016-11-22

## 2023-05-12 RX ORDER — LORAZEPAM 1 MG/1
TABLET ORAL
COMMUNITY

## 2023-05-12 RX ORDER — LOTEPREDNOL ETABONATE 5 MG/G
GEL OPHTHALMIC
COMMUNITY
Start: 2016-09-21

## 2023-05-12 RX ORDER — VENLAFAXINE 75 MG/1
TABLET ORAL 3 TIMES DAILY
COMMUNITY
Start: 2012-06-13

## 2023-05-12 RX ORDER — ATORVASTATIN CALCIUM 20 MG/1
10 TABLET, FILM COATED ORAL DAILY
COMMUNITY

## 2024-03-13 NOTE — PROGRESS NOTES
- -   Straight leg raise - -   Calf tenderness - -   Neurovascular Intact Intact     MRI LUMBAR SPINE WO CONT 11/29/16  IMPRESSION:  1. Mild multilevel disc disease showing little or no significant change compared with the previous examination. No neural impingement.  2. Extensive fatty infiltration of end plates of L3-4 indicative of chronic DDD, not appreciably changed.  3. Very mild discogenic endplate marrow edema on the left at L4-5 has decreased since the prior exam.    RADIOGRAPHS:  03/15/24 AP PELVIS AND BILAT HIP SAPPHIRE   AP pelvis and two views of bilateral hip: no fractures, no joint space narrowing, no osteophytes present.    XR VASYL HIPS 2/1/17  IMPRESSION: No fractures, mild joint space narrowing on left, minimal joint space narrowing on right, small lateral osteophytes present.     IMPRESSION:      ICD-10-CM    1. Hip pain, right  M25.551 [81450] Hips, Bilat, 3-4 Views      2. Hip pain, left  M25.552 [08705] Hips, Bilat, 3-4 Views      3. Lumbar pain  M54.50 [facility]ine [facility])     diclofenac sodium (VOLTAREN) 1 % GEL      4. Lumbar radiculopathy  M54.16 [facility]     diclofenac sodium (VOLTAREN) 1 % GEL        PLAN:  Voltaren gel Rx provided. There is no need for surgery or injection at this time.  She will follow up at the spine center.     Documentation by yudith Brooks, as documented by Leland Park MD.

## 2024-03-15 ENCOUNTER — OFFICE VISIT (OUTPATIENT)
Age: 64
End: 2024-03-15
Payer: COMMERCIAL

## 2024-03-15 VITALS — WEIGHT: 124 LBS | BODY MASS INDEX: 19.46 KG/M2 | HEIGHT: 67 IN | TEMPERATURE: 97.7 F

## 2024-03-15 DIAGNOSIS — M54.50 LUMBAR PAIN: ICD-10-CM

## 2024-03-15 DIAGNOSIS — M54.16 LUMBAR RADICULOPATHY: ICD-10-CM

## 2024-03-15 DIAGNOSIS — M25.552 HIP PAIN, LEFT: ICD-10-CM

## 2024-03-15 DIAGNOSIS — M25.551 HIP PAIN, RIGHT: Primary | ICD-10-CM

## 2024-03-15 PROCEDURE — 99203 OFFICE O/P NEW LOW 30 MIN: CPT | Performed by: SPECIALIST

## 2024-03-15 PROCEDURE — 73522 X-RAY EXAM HIPS BI 3-4 VIEWS: CPT | Performed by: SPECIALIST
